# Patient Record
Sex: MALE | Race: WHITE | Employment: FULL TIME | URBAN - METROPOLITAN AREA
[De-identification: names, ages, dates, MRNs, and addresses within clinical notes are randomized per-mention and may not be internally consistent; named-entity substitution may affect disease eponyms.]

---

## 2021-05-14 ENCOUNTER — HOSPITAL ENCOUNTER (EMERGENCY)
Age: 41
Discharge: HOME | End: 2021-05-14
Attending: EMERGENCY MEDICINE
Payer: COMMERCIAL

## 2021-05-14 VITALS
OXYGEN SATURATION: 95 % | WEIGHT: 160 LBS | HEIGHT: 70 IN | BODY MASS INDEX: 22.9 KG/M2 | TEMPERATURE: 97.8 F | SYSTOLIC BLOOD PRESSURE: 120 MMHG | RESPIRATION RATE: 18 BRPM | DIASTOLIC BLOOD PRESSURE: 70 MMHG | HEART RATE: 60 BPM

## 2021-05-14 DIAGNOSIS — F11.93 OPIATE WITHDRAWAL (HCC): Primary | ICD-10-CM

## 2021-05-14 PROCEDURE — 6360000002 HC RX W HCPCS: Performed by: EMERGENCY MEDICINE

## 2021-05-14 PROCEDURE — 99283 EMERGENCY DEPT VISIT LOW MDM: CPT

## 2021-05-14 RX ORDER — BUPRENORPHINE HYDROCHLORIDE 8 MG/1
16 TABLET SUBLINGUAL DAILY
Status: DISCONTINUED | OUTPATIENT
Start: 2021-05-14 | End: 2021-05-14

## 2021-05-14 RX ORDER — LEVOTHYROXINE SODIUM 0.03 MG/1
25 TABLET ORAL DAILY
COMMUNITY
Start: 2021-04-23

## 2021-05-14 RX ORDER — BUPRENORPHINE HYDROCHLORIDE AND NALOXONE HYDROCHLORIDE DIHYDRATE 2; .5 MG/1; MG/1
4 TABLET SUBLINGUAL DAILY
Status: DISCONTINUED | OUTPATIENT
Start: 2021-05-14 | End: 2021-05-14

## 2021-05-14 RX ORDER — BUPRENORPHINE HYDROCHLORIDE 8 MG/1
16 TABLET SUBLINGUAL DAILY
Status: COMPLETED | OUTPATIENT
Start: 2021-05-14 | End: 2021-05-14

## 2021-05-14 RX ADMIN — BUPRENORPHINE HYDROCHLORIDE 16 MG: 8 TABLET SUBLINGUAL at 04:00

## 2021-05-14 ASSESSMENT — ENCOUNTER SYMPTOMS
ABDOMINAL PAIN: 0
SHORTNESS OF BREATH: 0
WHEEZING: 0
DIARRHEA: 0
NAUSEA: 1
VOMITING: 0
COUGH: 0
EYE PAIN: 0
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
## 2021-11-20 ENCOUNTER — HOSPITAL ENCOUNTER (EMERGENCY)
Age: 41
Discharge: HOME | End: 2021-11-20
Payer: COMMERCIAL

## 2021-11-20 ENCOUNTER — APPOINTMENT (OUTPATIENT)
Dept: CT IMAGING | Age: 41
End: 2021-11-20
Payer: COMMERCIAL

## 2021-11-20 VITALS
RESPIRATION RATE: 21 BRPM | SYSTOLIC BLOOD PRESSURE: 139 MMHG | TEMPERATURE: 97.5 F | HEART RATE: 88 BPM | DIASTOLIC BLOOD PRESSURE: 97 MMHG | OXYGEN SATURATION: 97 %

## 2021-11-20 DIAGNOSIS — R10.9 FLANK PAIN: Primary | ICD-10-CM

## 2021-11-20 PROCEDURE — 85025 COMPLETE CBC W/AUTO DIFF WBC: CPT

## 2021-11-20 PROCEDURE — 99285 EMERGENCY DEPT VISIT HI MDM: CPT

## 2021-11-20 PROCEDURE — 74176 CT ABD & PELVIS W/O CONTRAST: CPT

## 2021-11-20 RX ORDER — 0.9 % SODIUM CHLORIDE 0.9 %
1000 INTRAVENOUS SOLUTION INTRAVENOUS ONCE
Status: DISCONTINUED | OUTPATIENT
Start: 2021-11-20 | End: 2021-11-20

## 2021-11-20 RX ORDER — ONDANSETRON 2 MG/ML
4 INJECTION INTRAMUSCULAR; INTRAVENOUS EVERY 30 MIN PRN
Status: DISCONTINUED | OUTPATIENT
Start: 2021-11-20 | End: 2021-11-20

## 2021-11-20 RX ORDER — BUPRENORPHINE AND NALOXONE 8; 2 MG/1; MG/1
1 FILM, SOLUBLE BUCCAL; SUBLINGUAL DAILY
COMMUNITY

## 2021-11-20 RX ORDER — MORPHINE SULFATE 2 MG/ML
4 INJECTION, SOLUTION INTRAMUSCULAR; INTRAVENOUS EVERY 30 MIN PRN
Status: DISCONTINUED | OUTPATIENT
Start: 2021-11-20 | End: 2021-11-20

## 2021-11-20 ASSESSMENT — PAIN DESCRIPTION - DESCRIPTORS
DESCRIPTORS: ACHING;SHARP
DESCRIPTORS: ACHING;SHARP

## 2021-11-20 ASSESSMENT — PAIN SCALES - GENERAL
PAINLEVEL_OUTOF10: 3
PAINLEVEL_OUTOF10: 8

## 2021-11-20 ASSESSMENT — PAIN DESCRIPTION - PAIN TYPE: TYPE: ACUTE PAIN

## 2021-11-20 ASSESSMENT — PAIN DESCRIPTION - FREQUENCY
FREQUENCY: CONTINUOUS
FREQUENCY: CONTINUOUS

## 2021-11-20 ASSESSMENT — PAIN DESCRIPTION - ORIENTATION
ORIENTATION: RIGHT
ORIENTATION: RIGHT

## 2021-11-20 ASSESSMENT — PAIN DESCRIPTION - LOCATION
LOCATION: FLANK
LOCATION: FLANK

## 2021-11-20 NOTE — ED PROVIDER NOTES
Triage Chief Complaint:   Flank Pain    [facility]:  Today in the ED I had the pleasure of caring for Janes Ramon who is a 46 y.o. male that presents today for right-sided flank pain. Acute in onset several hours prior to arrival.  Began in the right sided flank then wrapped around. To the right inguinal region. No associated abdominal pain. No nausea or vomiting. Pain was 9/10. Pain seems to wax and wane. Patient states \"I think I passed a kidney stone\". Patient has no history of kidney stones however. He denies any testicular pain or swelling. However does state that \"my testicles have been vibrating\". When asked patient how long they have been vibrating he tells me since he was born. He denies any new testicular symptoms or pains. He has been voiding baseline. No fever chills. No nausea vomiting diarrhea. No testicular abdominal or back trauma. ROS:  REVIEW OF SYSTEMS    At least 10 systems reviewed      All other review of systems are negative  See HPI and nursing notes for additional information       Past Medical History:   Diagnosis Date    Addiction ([facility] [address]     Thyroid disease      Past Surgical History:   Procedure Laterality Date    TUMOR EXCISION       History reviewed. No pertinent family history.   Social History     Socioeconomic History    Marital status: Single     Spouse name: Not on file    Number of children: Not on file    Years of education: Not on file    Highest education level: Not on file   Occupational History    Not on file   Tobacco Use    Smoking status: Current Every Day Smoker     Packs/day: 1.00     Types: Cigarettes    Smokeless tobacco: Never Used   Substance and Sexual Activity    Alcohol use: Not Currently     Comment: occ    Drug use: Not Currently    Sexual activity: Yes     Partners: Female   Other Topics Concern    Not on file   Social History Narrative    Not on file     Social Determinants of Health     Financial Resource Strain:     Difficulty of Paying Living Expenses: Not on file   Food Insecurity:     Worried About [address] in the Last Year: Not on file    Ran Out of Food in the Last Year: Not on file   Transportation Needs:     Lack of Transportation (Medical): Not on file    Lack of Transportation (Non-Medical): Not on file   Physical Activity:     Days of Exercise per Week: Not on file    Minutes of Exercise per Session: Not on file   Stress:     Feeling of Stress : Not on file   Social Connections:     Frequency of Communication with Friends and Family: Not on file    Frequency of Social Gatherings with Friends and Family: Not on file    Attends Jain Services: Not on file    Active Member of [address] [place] [address] or Organizations: Not on file    Attends Club or Organization Meetings: Not on file    Marital Status: Not on file   Intimate Partner Violence:     Fear of Current or Ex-Partner: Not on file    Emotionally Abused: Not on file    Physically Abused: Not on file    Sexually Abused: Not on file   Housing Stability:     Unable to Pay for Housing in the Last Year: Not on file    Number of [place] in the Last Year: Not on file    Unstable Housing in the Last Year: Not on file     No current facility-administered medications for this encounter. Current Outpatient Medications   Medication Sig Dispense Refill    buprenorphine-naloxone (SUBOXONE) 8-2 MG FILM SL film Place 1 Film under the tongue daily.  levothyroxine (SYNTHROID) 25 MCG tablet Take 25 mcg by mouth daily       Allergies   Allergen Reactions    Nsaids Swelling       Nursing Notes Reviewed    Physical Exam:  ED Triage Vitals [11/20/21 0316]   Enc Vitals Group      BP (!) 139/97      Pulse 74      Resp 18      Temp 97.5 °F (36.4 °C)      Temp Source Oral      SpO2 100 %      Weight       Height       Head Circumference       Peak Flow       Pain Score       Pain Loc       Pain Edu? Excl. in [address]?       General :Patient is awake alert oriented person place and time no acute distress nontoxic appearing  HEENT: Pupils are equally round and reactive to light extraocular motors are intact conjunctivae clear sclerae white there is no injection no icterus. Nose without any rhinorrhea or epistaxis. Oral mucosa is moist no exudate buccal mucosa shows no ulcerations. Uvula is midline    Neck: Neck is supple full range of motion trachea midline thyroid nonpalpable  Cardiac: Heart regular rate rhythm no murmurs rubs clicks or gallops  Lungs: Lungs are clear to auscultation there is no wheezing rhonchi or rales. There is no use of accessory muscles no nasal flaring identified. Chest wall: There is no tenderness to palpation over the chest wall or over ribs  Abdomen: Abdomen is soft nontender nondistended. There is no firm or pulsatile masses no rebound rigidity or guarding negative Arzate's negative McBurney, no peritoneal signs. Minimal right-sided flank tenderness palpation. No muscle spasm. No overlying rash. No bruises or crepitus. Suprapubic:  there is no tenderness to palpation over the external bladder   GENITOURINARY:  Penile lesions are absent. There is no urethral discharge or bleeding. There is no penile erythema, edema, or deformity. There is no scrotal erythema, edema, masses, or tenderness. Inguinal hernias are absent. Perineal crepitus, ecchymoses, erythema, and masses are absent. Musculoskeletal: 5 out of 5 strength in all 4 extremities full flexion extension abduction and adduction supination pronation of all extremities and all digits. No obvious muscle atrophy is noted. No focal muscle deficits are appreciated  Dermatology: Skin is warm and dry there is no obvious abscesses lacerations or lesions noted  Psych: Mentation is grossly normal cognition is grossly normal. Affect is appropriate  Neuro:  Motor intact sensory intact cranial nerves II through XII are intact level of consciousness is normal cerebellar function is normal reflexes are grossly normal. No evidence of incontinence or loss of bowel or bladder no saddle anesthesia noted Lymphatic: There is no submandibular or cervical adenopathy appreciated. I have reviewed and interpreted all of the currently available lab results from this visit (if applicable):  No results found for this visit on 11/20/21. Radiographs (if obtained):  [] The following radiograph was interpreted by myself in the absence of a radiologist:   [] Radiologist's Report Reviewed:  CT ABDOMEN PELVIS WO CONTRAST Additional Contrast? None   Final Result   No acute findings. EKG (if obtained):   Please See Note of attending physician for EKG interpretation. Chart review shows recent radiograph(s):  No results found. MDM:     Interventions given this visit:   Orders Placed This Encounter   Medications    DISCONTD: 0.9 % sodium chloride bolus    DISCONTD: morphine (PF) injection 4 mg    DISCONTD: ondansetron (ZOFRAN) injection 4 mg     . Patient presents today with right-sided flank pain. Likely secondary to recently passed stone. Patient refuses blood work and urinalysis. CT scan without contrast reveals no acute abnormality. Particular no evidence of hydronephrosis. Vital signs are stable here in the ED.  exam is negative. I have discussed the findings of today's workup with the patient and present family members and have addressed their questions and concerns. Important warning signs as well as new or worsening symptoms which would necessitate immediate return to the ED were discussed. The plan is to discharge from the ED at this time, and the patient is in stable condition. The patient acknowledged understanding is agreeable with this plan. The patient and/or family and I have discussed the diagnosis and risks, and we agree with discharging home to follow-up with their primary care, specialist or referral doctor. Questions addressed. Disposition and follow-up agreed upon.  Specific discharge instructions explained. We have discussed the symptoms which are most concerning that necessitate immediate return. We also discussed returning to the Emergency Department immediately if new or worsening symptoms occur. I independently managed patient today in the ED    BP (!) 139/97   Pulse 74   Temp 97.5 °F (36.4 °C) (Oral)   Resp 18   SpO2 100%       Clinical Impression:  1. Flank pain        Disposition referral (if applicable):  [facility] Emergency Department  [address] [facility]  [phone]    If symptoms worsen or persist    Disposition medications (if applicable):  New Prescriptions    No medications on file         Comment: Please note this report has been produced using speech recognition software and may contain errors related to that system including errors in grammar, punctuation, and spelling, as well as words and phrases that may be inappropriate. If there are any questions or concerns please feel free to contact the dictating provider for clarification.       [employer], [address]  11/20/21 0693

## 2021-11-20 NOTE — ED NOTES
Pt refused IV at this time, states \"I want the doctor to look at my testicles first\".       Kelly Schmitz RN  11/20/21 9868

## 2021-11-20 NOTE — ED NOTES
Pt discharged home at this time. Verbalized understanding of all discharge instructions. Leaves with NAD noted.       Kelly Schmitz RN  11/20/21 3292

## 2023-03-02 ENCOUNTER — APPOINTMENT (OUTPATIENT)
Dept: GENERAL RADIOLOGY | Age: 43
End: 2023-03-02
Payer: COMMERCIAL

## 2023-03-02 ENCOUNTER — HOSPITAL ENCOUNTER (EMERGENCY)
Age: 43
Discharge: LEFT BEFORE DISCHARGE | End: 2023-03-02
Attending: EMERGENCY MEDICINE
Payer: COMMERCIAL

## 2023-03-02 VITALS
RESPIRATION RATE: 15 BRPM | SYSTOLIC BLOOD PRESSURE: 164 MMHG | HEIGHT: 70 IN | BODY MASS INDEX: 22.9 KG/M2 | WEIGHT: 160 LBS | HEART RATE: 76 BPM | OXYGEN SATURATION: 98 % | DIASTOLIC BLOOD PRESSURE: 107 MMHG | TEMPERATURE: 98 F

## 2023-03-02 DIAGNOSIS — R00.2 PALPITATIONS: Primary | ICD-10-CM

## 2023-03-02 LAB
ANION GAP SERPL CALCULATED.3IONS-SCNC: 13 MMOL/L (ref 4–16)
BASOPHILS ABSOLUTE: 0.1 K/CU MM
BASOPHILS RELATIVE PERCENT: 0.7 % (ref 0–1)
BUN SERPL-MCNC: 18 MG/DL (ref 6–23)
CALCIUM SERPL-MCNC: 9.4 MG/DL (ref 8.3–10.6)
CHLORIDE BLD-SCNC: 100 MMOL/L (ref 99–110)
CO2: 25 MMOL/L (ref 21–32)
CREAT SERPL-MCNC: 0.9 MG/DL (ref 0.9–1.3)
DIFFERENTIAL TYPE: ABNORMAL
EKG ATRIAL RATE: 69 BPM
EKG DIAGNOSIS: NORMAL
EKG P AXIS: 56 DEGREES
EKG P-R INTERVAL: 132 MS
EKG Q-T INTERVAL: 458 MS
EKG QRS DURATION: 110 MS
EKG QTC CALCULATION (BAZETT): 490 MS
EKG R AXIS: -2 DEGREES
EKG T AXIS: 55 DEGREES
EKG VENTRICULAR RATE: 69 BPM
EOSINOPHILS ABSOLUTE: 0.1 K/CU MM
EOSINOPHILS RELATIVE PERCENT: 1.7 % (ref 0–3)
GFR SERPL CREATININE-BSD FRML MDRD: >60 ML/MIN/1.73M2
GLUCOSE SERPL-MCNC: 90 MG/DL (ref 70–99)
HCT VFR BLD CALC: 45.1 % (ref 42–52)
HEMOGLOBIN: 15.1 GM/DL (ref 13.5–18)
IMMATURE NEUTROPHIL %: 0.1 % (ref 0–0.43)
LYMPHOCYTES ABSOLUTE: 3.7 K/CU MM
LYMPHOCYTES RELATIVE PERCENT: 46 % (ref 24–44)
MCH RBC QN AUTO: 31.8 PG (ref 27–31)
MCHC RBC AUTO-ENTMCNC: 33.5 % (ref 32–36)
MCV RBC AUTO: 94.9 FL (ref 78–100)
MONOCYTES ABSOLUTE: 0.6 K/CU MM
MONOCYTES RELATIVE PERCENT: 7.8 % (ref 0–4)
NUCLEATED RBC %: 0 %
PDW BLD-RTO: 11.1 % (ref 11.7–14.9)
PLATELET # BLD: 301 K/CU MM (ref 140–440)
PMV BLD AUTO: 9.1 FL (ref 7.5–11.1)
POTASSIUM SERPL-SCNC: 4.1 MMOL/L (ref 3.5–5.1)
RBC # BLD: 4.75 M/CU MM (ref 4.6–6.2)
SEGMENTED NEUTROPHILS ABSOLUTE COUNT: 3.5 K/CU MM
SEGMENTED NEUTROPHILS RELATIVE PERCENT: 43.7 % (ref 36–66)
SODIUM BLD-SCNC: 138 MMOL/L (ref 135–145)
TOTAL IMMATURE NEUTOROPHIL: 0.01 K/CU MM
TOTAL NUCLEATED RBC: 0 K/CU MM
TROPONIN T: <0.01 NG/ML
WBC # BLD: 8.1 K/CU MM (ref 4–10.5)

## 2023-03-02 PROCEDURE — 99285 EMERGENCY DEPT VISIT HI MDM: CPT

## 2023-03-02 PROCEDURE — 93005 ELECTROCARDIOGRAM TRACING: CPT | Performed by: EMERGENCY MEDICINE

## 2023-03-02 PROCEDURE — 84484 ASSAY OF TROPONIN QUANT: CPT

## 2023-03-02 PROCEDURE — 71046 X-RAY EXAM CHEST 2 VIEWS: CPT

## 2023-03-02 PROCEDURE — 93010 ELECTROCARDIOGRAM REPORT: CPT | Performed by: INTERNAL MEDICINE

## 2023-03-02 PROCEDURE — 80048 BASIC METABOLIC PNL TOTAL CA: CPT

## 2023-03-02 PROCEDURE — 85025 COMPLETE CBC W/AUTO DIFF WBC: CPT

## 2023-03-02 ASSESSMENT — LIFESTYLE VARIABLES: HOW OFTEN DO YOU HAVE A DRINK CONTAINING ALCOHOL: NEVER

## 2023-03-02 NOTE — ED PROVIDER NOTES
[address] ENCOUNTER      Pt Name: Denver Seay  MRN: [id number]  [place] 1980  Date of evaluation: 3/2/2023  Provider: Esvin Jean MD  Insurance:  Payor: Junaid Sneed / Plan: Kaylee Barrow / Product Type: *No Product type* /   Current Code Status   Code Status: Not on file     CHIEF COMPLAINT       Chief Complaint   Patient presents with    Hypertension     As well as fluttering in chest         HISTORY OF PRESENT ILLNESS    HPI    Nursing Notes were reviewed. This is a 38 y.o. male who presents to the emergency department with hypertension and episodic palpitations. The patient says that he was at his outpatient providers office when he was told to come to the emergency department due to elevated blood pressure. The patient says that he saw blood pressures greater than 200 at the time he was in the office. Patient also describes paroxysmal/episodic palpitations. He denies chest pain. Denies nausea or vomiting. Denies recent fevers. Denies shortness of breath. Denies cough. The patient says he was evaluated for the palpitations by his cardiologist, was given a monitor to wear, and has a follow-up appointment next month. PAST MEDICAL HISTORY     Past Medical History:   Diagnosis Date    Addiction [facility])     Thyroid disease          SURGICAL HISTORY       Past Surgical History:   Procedure Laterality Date    TUMOR EXCISION           CURRENT MEDICATIONS       Discharge Medication List as of 3/2/2023  4:37 PM        CONTINUE these medications which have NOT CHANGED    Details   buprenorphine-naloxone (SUBOXONE) 8-2 MG FILM SL film Place 1 Film under the tongue daily. Historical Med      levothyroxine (SYNTHROID) 25 MCG tablet Take 25 mcg by mouth dailyHistorical Med             ALLERGIES     Nsaids    FAMILY HISTORY     History reviewed. No pertinent family history.        SOCIAL HISTORY       Social History     Socioeconomic History    Marital status: Single     Spouse name: None    Number of children: None    Years of education: None    Highest education level: None   Tobacco Use    Smoking status: Every Day     Packs/day: 1.00     Types: Cigarettes    Smokeless tobacco: Never   Substance and Sexual Activity    Alcohol use: Not Currently     Comment: occ    Drug use: Not Currently    Sexual activity: Yes     Partners: Female       PHYSICAL EXAM       ED Triage Vitals [03/02/23 1337]   BP Temp Temp src Heart Rate Resp SpO2 Height Weight   (!) 151/109 98 °F (36.7 °C) -- 76 15 98 % 5' 10\" (1.778 m) 160 lb (72.6 kg)       Physical Exam  Vitals and nursing note reviewed. Constitutional:       General: He is not in acute distress. Appearance: Normal appearance. He is not ill-appearing, toxic-appearing or diaphoretic. HENT:      Head: Normocephalic and atraumatic. Nose: Nose normal.      Mouth/Throat:      Mouth: Mucous membranes are moist.   Eyes:      Extraocular Movements: Extraocular movements intact. Cardiovascular:      Rate and Rhythm: Normal rate and regular rhythm. Heart sounds: Normal heart sounds. Pulmonary:      Effort: Pulmonary effort is normal.      Breath sounds: Normal breath sounds. Abdominal:      Palpations: Abdomen is soft. Musculoskeletal:         General: Normal range of motion. Skin:     General: Skin is warm. Neurological:      Mental Status: He is alert. Vitals:    Vitals:    03/02/23 1337 03/02/23 1402 03/02/23 1432 03/02/23 1501   BP: (!) 151/109 (!) 140/93 (!) 175/101 (!) 164/107   Pulse: 76      Resp: 15      Temp: 98 °F (36.7 °C)      SpO2: 98% 99% 99% 98%   Weight: 160 lb (72.6 kg)      Height: 5' 10\" (1.778 m)          DIAGNOSTIC RESULTS     EKG: All EKG's are interpreted by the Emergency Department Physician who either signs or Co-signs this chart in the absence of a cardiologist.    Normal sinus rhythm. Ventricular of 69. WA is 132. QRS is 110. QTc is somewhat prolonged at 490. There is no previous EKG available for comparison at this time. RADIOLOGY:   Non-plain film images such as CT, Ultrasound and MRI are read by the radiologist. Plain radiographic images are visualized and preliminarily interpreted by the emergency physician with the below findings:    Interpretation per the Radiologist below, if available at the time of this note:    XR CHEST (2 VW)   Final Result   No active cardiopulmonary process. LABS:  Labs Reviewed   CBC WITH AUTO DIFFERENTIAL - Abnormal; Notable for the following components:       Result Value    MCH 31.8 (*)     RDW 11.1 (*)     Lymphocytes % 46.0 (*)     Monocytes % 7.8 (*)     All other components within normal limits   TROPONIN   BASIC METABOLIC PANEL       All other labs were within normal range or not returned as of this dictation. MEDICAL DECISION MAKING   Medications - No data to display          Riana Coma Scale  Eye Opening: Spontaneous  Best Verbal Response: Oriented  Best Motor Response: Obeys commands  Riana Coma Scale Score: 15                     CIWA Assessment  BP: (!) 164/107  Heart Rate: 68                 MDM  This is a 38 y.o. male who presents to the emergency department with hypertension and episodic palpitations. The patient says that he was at his outpatient providers office when he was told to come to the emergency department due to elevated blood pressure. The patient says that he saw blood pressures greater than 200 at the time he was in the office. Patient also describes paroxysmal/episodic palpitations. He denies chest pain. Denies nausea or vomiting. Denies recent fevers. Denies shortness of breath. Denies cough. The patient says he was evaluated for the palpitations by his cardiologist, was given a monitor to wear, and has a follow-up appointment next month. Review of medical records:  Review of the patient's cardiology records indicates the following:  The patient was evaluated by cardiology in December 2022. The patient was given a Holter monitor which demonstrated a predominant rhythm of sinus with episodic periods of tachycardia, approximately 8.8% of the time. There were frequent premature ventricular ectopic beats. There were a number of premature supraventricular ectopic beats with one episode of SVT that spontaneously resolved. Cardiology notes further indicate that they attempted to contact the patient on January 24 and were unable to do so. At that time, they had intended to start the patient on Toprol-XL 12.5 mg daily. They still plan to have the patient complete an echocardiogram.    Sodium and potassium are normal indicating a low likelihood of hypo or hypernatremia, as well as hypo or hyperkalemia. BUN and creatinine are normal indicating a low likelihood of acute kidney injury or renal failure. Hemoglobin hematocrit are normal indicating no evidence of significant anemia. ECG shows no ST elevations and cardiac enzymes are normal indicating a low likelihood of STEMI or NSTEMI    Chest x-ray was completed and shows no evidence of significant intrathoracic pathology including no evidence of pneumonia, pneumothorax, and a low likelihood of aortic disease. Definitive etiology of the patient's symptoms was not clearly identified here in the emergency department, however, review of his records indicates that his palpitations are likely secondary to his tacky dysrhythmias identified on his pacemaker interrogation when he saw his cardiologist in December. They were unable to contact him for follow-up. I had planned on discussing this with the patient and I had planned on starting him on the medication that they recommended, however, when I returned to the patient's room to discuss his medical treatment plan, the patient had eloped from the emergency department.   He left the emergency department without discussing his plans with myself or with staff. Clinical Diagnoses Addressed  1. Palpitations            CONSULTS:  None    PROCEDURES:  Unless otherwise noted below, none     Procedures      FINAL IMPRESSION      1. Palpitations          DISPOSITION/PLAN   DISPOSITION Eloped - Left Before Treatment Complete 03/02/2023 04:36:43 PM      PATIENT REFERRED TO:  No follow-up provider specified. DISCHARGE MEDICATIONS:  Discharge Medication List as of 3/2/2023  4:37 PM        Controlled Substances Monitoring:     No flowsheet data found.     Alhaji Joe MD (electronically signed)  Attending Emergency Physician            Alhaji Joe MD  03/02/23 4734

## 2024-01-05 ENCOUNTER — HOSPITAL ENCOUNTER (EMERGENCY)
Age: 44
Discharge: PSYCHIATRIC OR SUBSTANCE USE FACILITY | End: 2024-01-06
Attending: EMERGENCY MEDICINE
Payer: COMMERCIAL

## 2024-01-05 DIAGNOSIS — T40.2X2A OPIOID OVERDOSE, INTENTIONAL SELF-HARM, INITIAL ENCOUNTER (HCC): ICD-10-CM

## 2024-01-05 DIAGNOSIS — F39 MOOD DISORDER (HCC): Primary | ICD-10-CM

## 2024-01-05 LAB
BASOPHILS ABSOLUTE: 0.1 K/CU MM
BASOPHILS RELATIVE PERCENT: 0.5 % (ref 0–1)
DIFFERENTIAL TYPE: ABNORMAL
EOSINOPHILS ABSOLUTE: 0.1 K/CU MM
EOSINOPHILS RELATIVE PERCENT: 1.3 % (ref 0–3)
HCT VFR BLD CALC: 44.3 % (ref 42–52)
HEMOGLOBIN: 14.9 GM/DL (ref 13.5–18)
IMMATURE NEUTROPHIL %: 0.2 % (ref 0–0.43)
LYMPHOCYTES ABSOLUTE: 3.9 K/CU MM
LYMPHOCYTES RELATIVE PERCENT: 35.6 % (ref 24–44)
MCH RBC QN AUTO: 31.2 PG (ref 27–31)
MCHC RBC AUTO-ENTMCNC: 33.6 % (ref 32–36)
MCV RBC AUTO: 92.7 FL (ref 78–100)
MONOCYTES ABSOLUTE: 0.9 K/CU MM
MONOCYTES RELATIVE PERCENT: 8.6 % (ref 0–4)
NUCLEATED RBC %: 0 %
PDW BLD-RTO: 11.5 % (ref 11.7–14.9)
PLATELET # BLD: 315 K/CU MM (ref 140–440)
PMV BLD AUTO: 9.3 FL (ref 7.5–11.1)
RBC # BLD: 4.78 M/CU MM (ref 4.6–6.2)
SEGMENTED NEUTROPHILS ABSOLUTE COUNT: 5.9 K/CU MM
SEGMENTED NEUTROPHILS RELATIVE PERCENT: 53.8 % (ref 36–66)
TOTAL IMMATURE NEUTOROPHIL: 0.02 K/CU MM
TOTAL NUCLEATED RBC: 0 K/CU MM
TOTAL RETICULOCYTE COUNT: 0.05 K/CU MM
WBC # BLD: 10.9 K/CU MM (ref 4–10.5)

## 2024-01-05 PROCEDURE — 99285 EMERGENCY DEPT VISIT HI MDM: CPT

## 2024-01-05 PROCEDURE — G0480 DRUG TEST DEF 1-7 CLASSES: HCPCS

## 2024-01-05 PROCEDURE — 85025 COMPLETE CBC W/AUTO DIFF WBC: CPT

## 2024-01-05 PROCEDURE — 80053 COMPREHEN METABOLIC PANEL: CPT

## 2024-01-06 VITALS
WEIGHT: 175 LBS | TEMPERATURE: 97.6 F | OXYGEN SATURATION: 99 % | HEIGHT: 70 IN | HEART RATE: 80 BPM | SYSTOLIC BLOOD PRESSURE: 134 MMHG | DIASTOLIC BLOOD PRESSURE: 76 MMHG | RESPIRATION RATE: 18 BRPM | BODY MASS INDEX: 25.05 KG/M2

## 2024-01-06 LAB
ACETAMINOPHEN LEVEL: <5 UG/ML (ref 15–30)
ALBUMIN SERPL-MCNC: 4.2 GM/DL (ref 3.4–5)
ALCOHOL SCREEN SERUM: 0.06 %WT/VOL
ALP BLD-CCNC: 88 IU/L (ref 40–129)
ALT SERPL-CCNC: 58 U/L (ref 10–40)
AMPHETAMINES: ABNORMAL
ANION GAP SERPL CALCULATED.3IONS-SCNC: 17 MMOL/L (ref 7–16)
AST SERPL-CCNC: 45 IU/L (ref 15–37)
BARBITURATE SCREEN URINE: NEGATIVE
BENZODIAZEPINE SCREEN, URINE: NEGATIVE
BILIRUB SERPL-MCNC: 0.3 MG/DL (ref 0–1)
BILIRUBIN URINE: NEGATIVE MG/DL
BLOOD, URINE: NEGATIVE
BUN SERPL-MCNC: 24 MG/DL (ref 6–23)
CALCIUM SERPL-MCNC: 9 MG/DL (ref 8.3–10.6)
CANNABINOID SCREEN URINE: ABNORMAL
CHLORIDE BLD-SCNC: 99 MMOL/L (ref 99–110)
CLARITY: CLEAR
CO2: 21 MMOL/L (ref 21–32)
COCAINE METABOLITE: NEGATIVE
COLOR: YELLOW
COMMENT UA: NORMAL
CREAT SERPL-MCNC: 0.9 MG/DL (ref 0.9–1.3)
DOSE AMOUNT: ABNORMAL
DOSE AMOUNT: ABNORMAL
DOSE TIME: ABNORMAL
DOSE TIME: ABNORMAL
FENTANYL URINE: NEGATIVE
GFR SERPL CREATININE-BSD FRML MDRD: >60 ML/MIN/1.73M2
GLUCOSE SERPL-MCNC: 94 MG/DL (ref 70–99)
GLUCOSE, URINE: NEGATIVE MG/DL
KETONES, URINE: NEGATIVE MG/DL
LEUKOCYTE ESTERASE, URINE: NEGATIVE
NITRITE URINE, QUANTITATIVE: NEGATIVE
OPIATES, URINE: NEGATIVE
OXYCODONE: NEGATIVE
PH, URINE: 5.5 (ref 5–8)
POTASSIUM SERPL-SCNC: 3.3 MMOL/L (ref 3.5–5.1)
PROTEIN UA: NEGATIVE MG/DL
SALICYLATE LEVEL: <0.3 MG/DL (ref 15–30)
SODIUM BLD-SCNC: 137 MMOL/L (ref 135–145)
SPECIFIC GRAVITY UA: 1.02 (ref 1–1.03)
TOTAL PROTEIN: 7.3 GM/DL (ref 6.4–8.2)
UROBILINOGEN, URINE: 0.2 MG/DL (ref 0.2–1)

## 2024-01-06 PROCEDURE — 80307 DRUG TEST PRSMV CHEM ANLYZR: CPT

## 2024-01-06 PROCEDURE — 81003 URINALYSIS AUTO W/O SCOPE: CPT

## 2024-01-06 PROCEDURE — 90792 PSYCH DIAG EVAL W/MED SRVCS: CPT

## 2024-01-06 ASSESSMENT — PAIN DESCRIPTION - LOCATION: LOCATION: HEAD

## 2024-01-06 ASSESSMENT — PAIN SCALES - GENERAL: PAINLEVEL_OUTOF10: 3

## 2024-01-06 NOTE — ACP (ADVANCE CARE PLANNING)
Patient does not have any ACP documents/Medical Power of .     LSW notes hospital will follow [place]'s Next of Kin hierarchy in the following descending order for priority:    Guardian  Spouse  Majority of adult Children  Parents  Majority of adult Siblings  Nearest Relative not described above    Per [place] Next of Kin hierarchy: Patients' parent will be Primary Healthcare Decision Maker.

## 2024-01-06 NOTE — ED NOTES
Superior at the bedside for pt transport, all paperwork provided to superior with pt belongings, pt transferred stable to AM behavior.

## 2024-01-06 NOTE — ED NOTES
Pt resting in bed eyes closed, sitter at the bedside, no noted s/s of distress, awaiting for placement.
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
## 2024-01-06 NOTE — ED NOTES
Transfer Center Checklist for Behavioral Health Transfers      Currently in Restraints Now or During this Encounter: No  (Specify if Agitation or self harm is noted in ED?)            Medical Clearance Documented and Verified in the Chart: Yes    LABS  Labs Resulted (see below, if applicable): Yes  Are Any of the Labs Abnormal: No    CBC:   Lab Results   Component Value Date/Time    WBC 10.9 01/05/2024 11:30 PM    RBC 4.78 01/05/2024 11:30 PM    HGB 14.9 01/05/2024 11:30 PM    HCT 44.3 01/05/2024 11:30 PM    MCV 92.7 01/05/2024 11:30 PM    MCH 31.2 01/05/2024 11:30 PM    MCHC 33.6 01/05/2024 11:30 PM    RDW 11.5 01/05/2024 11:30 PM     01/05/2024 11:30 PM    MPV 9.3 01/05/2024 11:30 PM     CMP:   Lab Results   Component Value Date/Time     01/05/2024 11:30 PM    K 3.3 01/05/2024 11:30 PM    CL 99 01/05/2024 11:30 PM    CO2 21 01/05/2024 11:30 PM    BUN 24 01/05/2024 11:30 PM    CREATININE 0.9 01/05/2024 11:30 PM    LABGLOM >60 01/05/2024 11:30 PM    GLUCOSE 94 01/05/2024 11:30 PM    PROT 7.3 01/05/2024 11:30 PM    LABALBU 4.2 01/05/2024 11:30 PM    CALCIUM 9.0 01/05/2024 11:30 PM    BILITOT 0.3 01/05/2024 11:30 PM    ALKPHOS 88 01/05/2024 11:30 PM    AST 45 01/05/2024 11:30 PM    ALT 58 01/05/2024 11:30 PM     Drug Panel:   Lab Results   Component Value Date/Time    OPIAU NEGATIVE 01/06/2024 12:22 AM     UA:  Lab Results   Component Value Date/Time    COLORU YELLOW 01/06/2024 12:22 AM    LABPH 5.5 01/06/2024 12:22 AM    PROTEINU NEGATIVE 01/06/2024 12:22 AM    KETUA NEGATIVE 01/06/2024 12:22 AM    BILIRUBINUR NEGATIVE 01/06/2024 12:22 AM    BLOODU NEGATIVE 01/06/2024 12:22 AM    UROBILINOGEN 0.2 01/06/2024 12:22 AM    NITRU NEGATIVE 01/06/2024 12:22 AM    LEUKOCYTESUR NEGATIVE 01/06/2024 12:22 AM     PREGNANCY TEST: No results found for: \"PREGTESTUR\"    Patient's Current Location: [facility] EMERGENCY DEPARTMENT     Chief Complaint   Patient presents with
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
## 2024-01-06 NOTE — ED NOTES
Pt mother called, provided report on pt status per pt approval, pt currently communicating to mother over the phone, sitter at the bedside, will continue to monitor.

## 2024-01-06 NOTE — ED NOTES
Pt pink slip faxed to [phone] fax #[phone]. Spoke with Isabel @  [facility] and they are aware that they should be receiving the pink slip ASAP.

## 2024-01-06 NOTE — ED PROVIDER NOTES
[facility] EMERGENCY DEPARTMENT  EMERGENCY DEPARTMENT ENCOUNTER      Pt Name: Napoleon Plasencia  MRN: [id number]  Birthdate 1980  Date of evaluation: 1/5/2024  Provider: Ysabel Bustamante MD    CHIEF COMPLAINT       Chief Complaint   Patient presents with    Suicidal    Drug Overdose     4mg nasal         HISTORY OF PRESENT ILLNESS      Napoleon Plasencia is a 43 y.o. male who presents to the emergency department  for   Chief Complaint   Patient presents with    Suicidal    Drug Overdose     4mg nasal       43-year-old male presents on a pink slip for reported intentional act of self-harm by intentional overdose of Suboxone.  He comes in by police.  Patient reports that has been under stress due to some family situations recently.  He is currently on Suboxone as a maintenance medicine.  We do not have a collateral historian at bedside but reportedly he made statements to family about wanting to harm himself.  He was later found \"unresponsive\" after having taking some of his Suboxone.  Authorities were called to scene.  He was given 4 mg of Narcan with improvement in his mental status.  He is brought to the emergency department GCS of 15.  Alert and oriented.  He does seem a bit emotionally labile in the emergency department.  He does endorse some thoughts of self-harm.  He denies any other ingestions.          Nursing Notes, Triage Notes & Vital Signs were reviewed.      REVIEW OF SYSTEMS    (2-9 systems for level 4, 10 or more for level 5)     Review of Systems   Psychiatric/Behavioral:  Positive for suicidal ideas.        Except as noted above the remainder of the review of systems was reviewed and negative.       PAST MEDICAL HISTORY     Past Medical History:   Diagnosis Date    Addiction (HCC)     Thyroid disease        Prior to Admission medications    Medication Sig Start Date End Date Taking? Authorizing Provider   buprenorphine-naloxone (SUBOXONE) 8-2 MG FILM SL film  MEDICATIONS:  New Prescriptions    No medications on file       ED Provider Disposition Time  DISPOSITION Decision To Transfer 01/06/2024 04:47:24 AM      Appropriate personal protective equipment was worn during the patient's evaluation.  These included surgical, eye protection, surgical mask or in 95 respirator and gloves.  The patient was also placed in a surgical mask for the prevention of possible spread of respiratory viral illnesses.    The Patient was instructed to read the package inserts with any medication that was prescribed.  Major potential reactions and medication interactions were discussed.  The Patient understands that there are numerous possible adverse reactions not covered.    The patient was also instructed to arrange follow-up with his or her primary care provider for review of any pending labwork or incidental findings on any radiology results that were obtained.  All efforts were made to discuss any incidental findings that require further monitoring.      Controlled Substances Monitoring:          No data to display                (Please note that portions of this note were completed with a voice recognition program.  Efforts were made to edit the dictations but occasionally words are mis-transcribed.)    Ysabel Bustamante MD (electronically signed)  Attending Emergency Physician           Ysabel Myers MD  01/06/24 0351       Ysabel Myers MD  01/06/24 0448

## 2024-04-01 NOTE — ED TRIAGE NOTES
Dr tee sent patient over d/t hypertension.  Patient states he has a fluttering in his chest
Hypercholesterolemia

## 2025-06-01 ENCOUNTER — HOSPITAL ENCOUNTER (INPATIENT)
Age: 45
LOS: 3 days | Discharge: PSYCHIATRIC OR SUBSTANCE USE FACILITY | End: 2025-06-05
Admitting: STUDENT IN AN ORGANIZED HEALTH CARE EDUCATION/TRAINING PROGRAM
Payer: COMMERCIAL

## 2025-06-01 DIAGNOSIS — Z51.81 ENCOUNTER FOR MONITORING SUBOXONE MAINTENANCE THERAPY: ICD-10-CM

## 2025-06-01 DIAGNOSIS — F10.930 ALCOHOL WITHDRAWAL SYNDROME WITHOUT COMPLICATION (HCC): ICD-10-CM

## 2025-06-01 DIAGNOSIS — R45.851 SUICIDAL IDEATION: Primary | ICD-10-CM

## 2025-06-01 DIAGNOSIS — Z79.891 ENCOUNTER FOR MONITORING SUBOXONE MAINTENANCE THERAPY: ICD-10-CM

## 2025-06-01 DIAGNOSIS — F10.129: ICD-10-CM

## 2025-06-01 LAB
ALBUMIN SERPL-MCNC: 4.6 G/DL (ref 3.4–5)
ALBUMIN/GLOB SERPL: 1.4 {RATIO} (ref 1.1–2.2)
ALP SERPL-CCNC: 220 U/L (ref 40–129)
ALT SERPL-CCNC: 186 U/L (ref 10–40)
AMPHET UR QL SCN: NEGATIVE
ANION GAP SERPL CALCULATED.3IONS-SCNC: 20 MMOL/L (ref 9–17)
APAP SERPL-MCNC: <5 UG/ML (ref 10–30)
AST SERPL-CCNC: 351 U/L (ref 15–37)
BARBITURATES UR QL SCN: NEGATIVE
BENZODIAZ UR QL: NEGATIVE
BILIRUB SERPL-MCNC: 1 MG/DL (ref 0–1)
BUN SERPL-MCNC: 9 MG/DL (ref 7–20)
CALCIUM SERPL-MCNC: 10 MG/DL (ref 8.3–10.6)
CANNABINOIDS UR QL SCN: POSITIVE
CHLORIDE SERPL-SCNC: 102 MMOL/L (ref 99–110)
CO2 SERPL-SCNC: 21 MMOL/L (ref 21–32)
COCAINE UR QL SCN: NEGATIVE
CREAT SERPL-MCNC: 0.7 MG/DL (ref 0.9–1.3)
ERYTHROCYTE [DISTWIDTH] IN BLOOD BY AUTOMATED COUNT: 11.8 % (ref 11.7–14.9)
ETHANOLAMINE SERPL-MCNC: 409 MG/DL (ref 0–0.08)
FENTANYL UR QL: NEGATIVE
GFR, ESTIMATED: >90 ML/MIN/1.73M2
GLUCOSE SERPL-MCNC: 111 MG/DL (ref 74–99)
HCT VFR BLD AUTO: 50.4 % (ref 42–52)
HGB BLD-MCNC: 17.2 G/DL (ref 13.5–18)
MCH RBC QN AUTO: 36.1 PG (ref 27–31)
MCHC RBC AUTO-ENTMCNC: 34.1 G/DL (ref 32–36)
MCV RBC AUTO: 105.7 FL (ref 78–100)
OPIATES UR QL SCN: NEGATIVE
OXYCODONE UR QL SCN: NEGATIVE
PLATELET, FLUORESCENCE: 203 K/UL (ref 140–440)
PMV BLD AUTO: 9.7 FL (ref 7.5–11.1)
POTASSIUM SERPL-SCNC: 4 MMOL/L (ref 3.5–5.1)
PROT SERPL-MCNC: 7.8 G/DL (ref 6.4–8.2)
RBC # BLD AUTO: 4.77 M/UL (ref 4.6–6.2)
SALICYLATES SERPL-MCNC: <0.5 MG/DL (ref 15–30)
SODIUM SERPL-SCNC: 143 MMOL/L (ref 136–145)
TEST INFORMATION: ABNORMAL
WBC OTHER # BLD: 6.7 K/UL (ref 4–10.5)

## 2025-06-01 PROCEDURE — 80053 COMPREHEN METABOLIC PANEL: CPT

## 2025-06-01 PROCEDURE — 85027 COMPLETE CBC AUTOMATED: CPT

## 2025-06-01 PROCEDURE — 80307 DRUG TEST PRSMV CHEM ANLYZR: CPT

## 2025-06-01 PROCEDURE — 80179 DRUG ASSAY SALICYLATE: CPT

## 2025-06-01 PROCEDURE — 99285 EMERGENCY DEPT VISIT HI MDM: CPT

## 2025-06-01 PROCEDURE — 90792 PSYCH DIAG EVAL W/MED SRVCS: CPT | Performed by: REGISTERED NURSE

## 2025-06-01 PROCEDURE — 80143 DRUG ASSAY ACETAMINOPHEN: CPT

## 2025-06-01 PROCEDURE — G0480 DRUG TEST DEF 1-7 CLASSES: HCPCS

## 2025-06-01 ASSESSMENT — PAIN - FUNCTIONAL ASSESSMENT: PAIN_FUNCTIONAL_ASSESSMENT: NONE - DENIES PAIN

## 2025-06-02 PROBLEM — F10.920: Status: ACTIVE | Noted: 2025-06-02

## 2025-06-02 LAB
ETHANOLAMINE SERPL-MCNC: 120 MG/DL (ref 0–0.08)
ETHANOLAMINE SERPL-MCNC: <10 MG/DL (ref 0–0.08)

## 2025-06-02 PROCEDURE — 99214 OFFICE O/P EST MOD 30 MIN: CPT

## 2025-06-02 PROCEDURE — 6370000000 HC RX 637 (ALT 250 FOR IP): Performed by: STUDENT IN AN ORGANIZED HEALTH CARE EDUCATION/TRAINING PROGRAM

## 2025-06-02 PROCEDURE — 2580000003 HC RX 258: Performed by: STUDENT IN AN ORGANIZED HEALTH CARE EDUCATION/TRAINING PROGRAM

## 2025-06-02 PROCEDURE — 2500000003 HC RX 250 WO HCPCS: Performed by: STUDENT IN AN ORGANIZED HEALTH CARE EDUCATION/TRAINING PROGRAM

## 2025-06-02 PROCEDURE — 6360000002 HC RX W HCPCS: Performed by: STUDENT IN AN ORGANIZED HEALTH CARE EDUCATION/TRAINING PROGRAM

## 2025-06-02 PROCEDURE — G0480 DRUG TEST DEF 1-7 CLASSES: HCPCS

## 2025-06-02 PROCEDURE — 94761 N-INVAS EAR/PLS OXIMETRY MLT: CPT

## 2025-06-02 PROCEDURE — 1200000000 HC SEMI PRIVATE

## 2025-06-02 RX ORDER — ENOXAPARIN SODIUM 100 MG/ML
40 INJECTION SUBCUTANEOUS DAILY
Status: DISCONTINUED | OUTPATIENT
Start: 2025-06-02 | End: 2025-06-05 | Stop reason: HOSPADM

## 2025-06-02 RX ORDER — SODIUM CHLORIDE 0.9 % (FLUSH) 0.9 %
5-40 SYRINGE (ML) INJECTION EVERY 12 HOURS SCHEDULED
Status: DISCONTINUED | OUTPATIENT
Start: 2025-06-02 | End: 2025-06-05 | Stop reason: HOSPADM

## 2025-06-02 RX ORDER — LANOLIN ALCOHOL/MO/W.PET/CERES
100 CREAM (GRAM) TOPICAL DAILY
Status: DISCONTINUED | OUTPATIENT
Start: 2025-06-02 | End: 2025-06-05 | Stop reason: HOSPADM

## 2025-06-02 RX ORDER — ONDANSETRON 2 MG/ML
4 INJECTION INTRAMUSCULAR; INTRAVENOUS EVERY 6 HOURS PRN
Status: DISCONTINUED | OUTPATIENT
Start: 2025-06-02 | End: 2025-06-05 | Stop reason: HOSPADM

## 2025-06-02 RX ORDER — LANOLIN ALCOHOL/MO/W.PET/CERES
100 CREAM (GRAM) TOPICAL DAILY
Status: DISCONTINUED | OUTPATIENT
Start: 2025-06-02 | End: 2025-06-03

## 2025-06-02 RX ORDER — ONDANSETRON 4 MG/1
4 TABLET, ORALLY DISINTEGRATING ORAL ONCE
Status: COMPLETED | OUTPATIENT
Start: 2025-06-02 | End: 2025-06-02

## 2025-06-02 RX ORDER — SCOPOLAMINE 1 MG/3D
1 PATCH, EXTENDED RELEASE TRANSDERMAL ONCE
Status: COMPLETED | OUTPATIENT
Start: 2025-06-02 | End: 2025-06-05

## 2025-06-02 RX ORDER — POTASSIUM CHLORIDE 1500 MG/1
40 TABLET, EXTENDED RELEASE ORAL PRN
Status: DISCONTINUED | OUTPATIENT
Start: 2025-06-02 | End: 2025-06-05

## 2025-06-02 RX ORDER — POTASSIUM CHLORIDE 7.45 MG/ML
10 INJECTION INTRAVENOUS PRN
Status: DISCONTINUED | OUTPATIENT
Start: 2025-06-02 | End: 2025-06-05

## 2025-06-02 RX ORDER — SODIUM CHLORIDE 9 MG/ML
INJECTION, SOLUTION INTRAVENOUS PRN
Status: DISCONTINUED | OUTPATIENT
Start: 2025-06-02 | End: 2025-06-05 | Stop reason: HOSPADM

## 2025-06-02 RX ORDER — BUPRENORPHINE HYDROCHLORIDE AND NALOXONE HYDROCHLORIDE DIHYDRATE 8; 2 MG/1; MG/1
1 TABLET SUBLINGUAL DAILY
Status: DISCONTINUED | OUTPATIENT
Start: 2025-06-02 | End: 2025-06-05

## 2025-06-02 RX ORDER — PHENOBARBITAL 32.4 MG/1
32.4 TABLET ORAL 2 TIMES DAILY
Status: COMPLETED | OUTPATIENT
Start: 2025-06-03 | End: 2025-06-04

## 2025-06-02 RX ORDER — MAGNESIUM SULFATE IN WATER 40 MG/ML
2000 INJECTION, SOLUTION INTRAVENOUS PRN
Status: DISCONTINUED | OUTPATIENT
Start: 2025-06-02 | End: 2025-06-05

## 2025-06-02 RX ORDER — PHENOBARBITAL 32.4 MG/1
32.4 TABLET ORAL DAILY
Status: DISCONTINUED | OUTPATIENT
Start: 2025-06-05 | End: 2025-06-04

## 2025-06-02 RX ORDER — ONDANSETRON 4 MG/1
4 TABLET, ORALLY DISINTEGRATING ORAL EVERY 8 HOURS PRN
Status: DISCONTINUED | OUTPATIENT
Start: 2025-06-02 | End: 2025-06-05 | Stop reason: HOSPADM

## 2025-06-02 RX ORDER — PHENOBARBITAL 32.4 MG/1
32.4 TABLET ORAL EVERY 6 HOURS PRN
Status: DISPENSED | OUTPATIENT
Start: 2025-06-02 | End: 2025-06-04

## 2025-06-02 RX ORDER — PHENOBARBITAL 32.4 MG/1
16.2 TABLET ORAL 2 TIMES DAILY
Status: COMPLETED | OUTPATIENT
Start: 2025-06-04 | End: 2025-06-05

## 2025-06-02 RX ORDER — PHENOBARBITAL 32.4 MG/1
32.4 TABLET ORAL 4 TIMES DAILY
Status: COMPLETED | OUTPATIENT
Start: 2025-06-02 | End: 2025-06-03

## 2025-06-02 RX ORDER — PHENOBARBITAL 32.4 MG/1
16.2 TABLET ORAL EVERY 6 HOURS PRN
Status: DISCONTINUED | OUTPATIENT
Start: 2025-06-04 | End: 2025-06-05

## 2025-06-02 RX ORDER — PHENOBARBITAL 32.4 MG/1
64.8 TABLET ORAL 2 TIMES DAILY
Status: DISCONTINUED | OUTPATIENT
Start: 2025-06-03 | End: 2025-06-04

## 2025-06-02 RX ORDER — ACETAMINOPHEN 325 MG/1
650 TABLET ORAL EVERY 6 HOURS PRN
Status: DISCONTINUED | OUTPATIENT
Start: 2025-06-02 | End: 2025-06-05 | Stop reason: HOSPADM

## 2025-06-02 RX ORDER — SODIUM CHLORIDE 0.9 % (FLUSH) 0.9 %
5-40 SYRINGE (ML) INJECTION PRN
Status: DISCONTINUED | OUTPATIENT
Start: 2025-06-02 | End: 2025-06-05 | Stop reason: HOSPADM

## 2025-06-02 RX ORDER — CHLORDIAZEPOXIDE HYDROCHLORIDE 5 MG/1
10 CAPSULE, GELATIN COATED ORAL ONCE
Status: COMPLETED | OUTPATIENT
Start: 2025-06-02 | End: 2025-06-02

## 2025-06-02 RX ORDER — PHENOBARBITAL 32.4 MG/1
32.4 TABLET ORAL 2 TIMES DAILY
Status: DISCONTINUED | OUTPATIENT
Start: 2025-06-04 | End: 2025-06-04

## 2025-06-02 RX ORDER — POLYETHYLENE GLYCOL 3350 17 G/17G
17 POWDER, FOR SOLUTION ORAL DAILY PRN
Status: DISCONTINUED | OUTPATIENT
Start: 2025-06-02 | End: 2025-06-05 | Stop reason: HOSPADM

## 2025-06-02 RX ORDER — LEVOTHYROXINE SODIUM 25 UG/1
25 TABLET ORAL DAILY
Status: DISCONTINUED | OUTPATIENT
Start: 2025-06-03 | End: 2025-06-05 | Stop reason: HOSPADM

## 2025-06-02 RX ADMIN — PHENOBARBITAL 32.4 MG: 32.4 TABLET ORAL at 21:15

## 2025-06-02 RX ADMIN — PHENOBARBITAL SODIUM 730.6 MG: 130 INJECTION INTRAMUSCULAR; INTRAVENOUS at 15:53

## 2025-06-02 RX ADMIN — CHLORDIAZEPOXIDE HYDROCHLORIDE 10 MG: 5 CAPSULE ORAL at 14:47

## 2025-06-02 RX ADMIN — BUPRENORPHINE HYDROCHLORIDE AND NALOXONE HYDROCHLORIDE DIHYDRATE 1 TABLET: 8; 2 TABLET SUBLINGUAL at 12:50

## 2025-06-02 RX ADMIN — ACETAMINOPHEN 650 MG: 325 TABLET ORAL at 20:48

## 2025-06-02 RX ADMIN — SODIUM CHLORIDE, PRESERVATIVE FREE 10 ML: 5 INJECTION INTRAVENOUS at 20:49

## 2025-06-02 RX ADMIN — ONDANSETRON 4 MG: 2 INJECTION INTRAMUSCULAR; INTRAVENOUS at 20:48

## 2025-06-02 RX ADMIN — Medication 100 MG: at 16:21

## 2025-06-02 RX ADMIN — PHENOBARBITAL 32.4 MG: 32.4 TABLET ORAL at 20:49

## 2025-06-02 RX ADMIN — PHENOBARBITAL 32.4 MG: 32.4 TABLET ORAL at 17:34

## 2025-06-02 RX ADMIN — ONDANSETRON 4 MG: 4 TABLET, ORALLY DISINTEGRATING ORAL at 14:47

## 2025-06-02 RX ADMIN — ENOXAPARIN SODIUM 40 MG: 100 INJECTION SUBCUTANEOUS at 17:34

## 2025-06-02 ASSESSMENT — LIFESTYLE VARIABLES
HOW MANY STANDARD DRINKS CONTAINING ALCOHOL DO YOU HAVE ON A TYPICAL DAY: 1 OR 2
HOW OFTEN DO YOU HAVE A DRINK CONTAINING ALCOHOL: 2-4 TIMES A MONTH

## 2025-06-02 ASSESSMENT — PAIN SCALES - GENERAL: PAINLEVEL_OUTOF10: 0

## 2025-06-02 NOTE — PROGRESS NOTES
4 Eyes Skin Assessment     NAME:  Napoleon Plasencia  YOB: 1980  MEDICAL RECORD NUMBER:  [id number]    The patient is being assessed for  Admission    I agree that at least one RN has performed a thorough Head to Toe Skin Assessment on the patient. ALL assessment sites listed below have been assessed.      Areas assessed by both nurses:    Head, Face, Ears, Shoulders, Back, Chest, Arms, Elbows, Hands, Sacrum. Buttock, Coccyx, Ischium, Legs. Feet and Heels, and Under Medical Devices         Does the Patient have a Wound? No noted wound(s)       Roman Prevention initiated by RN: No  Wound Care Orders initiated by RN: No    Pressure Injury (Stage 3,4, Unstageable, DTI, NWPT, and Complex wounds) if present, place Wound referral order by RN under : No    New Ostomies, if present place, Ostomy referral order under : No     Nurse 1 eSignature: Electronically signed by Augusto Willams RN on 6/2/25 at 4:52 PM EDT    **SHARE this note so that the co-signing nurse can place an eSignature**    Nurse 2 eSignature: Electronically signed by Almita Thompson RN on 6/2/25 at 6:27 PM EDT

## 2025-06-02 NOTE — ED PROVIDER NOTES
3:57 PM: Assumed care of patient at signout. For more details, please see note from same day. Briefly, Napoleon Plasencia is a 45 y.o. male with a PMH significant for alcohol and opiate use disorder, who presents in the setting of acute alcohol intoxication endorsing suicidal ideation.     ED course summary:   - Hemodynamically stable.  Acutely intoxicated.  Endorsed SI.  - No medical complaints, reassuring medical examination.  Reassuring labs, medically cleared.    - Seen by psychiatry, recommended inpatient admission    Plan at time of sign-out:   - Pending acceptance to a psychiatric facility and transferred for further management.    ED course since sign-out:  Was informed by nursing staff that the patient is having significant tremors.  I assessed him.  He is hemodynamically stable, although now moderately hypertensive.  He is anxious, with tremors in all extremities, with myoclonus in all extremities and his tongue, with symptoms that he believes are consistent with alcohol withdrawal.  I agree; he is exhibiting symptoms of moderate alcohol withdrawal.  Indicated a CIWA score, and prescribed a load with phenobarbital to prevent progression to a severe and complicated withdrawal.  Prescribed vitamins, including thiamine and folic acid.  He has received a dose of chlordiazepoxide, but his symptoms were refractory to them.  The patient will be admitted to medicine for further management and active medical clearance, with plans for psychiatric management in the future.    BP (!) 179/89   Pulse 62   Temp 98.5 °F (36.9 °C) (Oral)   Resp 19   Ht 1.778 m (5' 10\")   Wt 74.8 kg (165 lb)   SpO2 95%   BMI 23.68 kg/m²   Labs:  Labs Reviewed   CBC - Abnormal; Notable for the following components:       Result Value    .7 (*)     MCH 36.1 (*)     All other components within normal limits   COMPREHENSIVE METABOLIC PANEL - Abnormal; Notable for the following components:    Anion Gap 20 (*)     Glucose 111 (*)

## 2025-06-02 NOTE — PROGRESS NOTES
\"Received request for Telepsychiatry evaluation.  Medical screening labs are still pending.  Our team will continue to follow and will initiate evaluation when more information is available and notes in the chart

## 2025-06-02 NOTE — PROGRESS NOTES
Patient arrived in ICU positioned in bed and connected to monitors. Patient is alert and oriented X4, /84, HR 72, rhythm is NSR, pulse Ox is 98% on room air, lung sounds are clear, breaths are even and unlabored. Bowel sounds are active. No wounds are observed.   CIWA is 13  Peripheral IVs are flushed per protocol.
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
## 2025-06-02 NOTE — H&P
History and Physical 25        NAME: Napoleon Plasencia  : 1980  MRN: [id number]      Assessment/Plan:  Napoleon Plasencia is a 45 y.o. male with a history of hypothyroidism and substance abuse who presented to [facility] 2025 with acute suicidal ideation    Acute suicidal ideation  Psychiatry consulted  Bedside sitter  Admitted to ICU  Remy slipped    Acute alcohol intoxication  CIWA protocol started  P.o. thiamine  Continue to monitor vitals    DVT Prophylaxis: Enoxaparin  Code Status/Surrogate Decision Maker: Full code      Current living situation: Home  Expected Disposition: Patient psych  Estimated discharge date: TBD      Chief Complaint:    Suicidal ideation    History of Present Illness:    45-year-old male presented to the emergency room with reports of suicidal ideation patient was evaluated by psychiatry and plan is to have the patient admitted to inpatient psych facility.  Patient has acute alcohol intoxication at this time has been admitted failed CIWA protocol    ROS:    Review of Systems     Review of systems is negative except as mentioned above     Past Medical, Surgical, Social, Family History:   Past Medical History:   Diagnosis Date    Addiction (HCC)     Thyroid disease      Past Surgical History:   Procedure Laterality Date    TUMOR EXCISION       Social History     Socioeconomic History    Marital status: Single     Spouse name: Not on file    Number of children: Not on file    Years of education: Not on file    Highest education level: Not on file   Occupational History    Not on file   Tobacco Use    Smoking status: Every Day     Current packs/day: 1.00     Types: Cigarettes    Smokeless tobacco: Never   Substance and Sexual Activity    Alcohol use: Not Currently     Comment: occ    Drug use: Not Currently    Sexual activity: Yes     Partners: Female   Other Topics Concern    Not on file   Social History Narrative    Not on file     Social Drivers of Health     Financial Resource

## 2025-06-02 NOTE — DISCHARGE INSTRUCTIONS
Addiction Support and Treatment Options [facility] REACH  Outpatient treatment for both men & women  [address]   [facility] [address]  [phone]    [address] [facility] [address]  [phone]    [facility]    [address] [facility] [address]  [phone]  Intensive Outpatient and Residential    [facility]        [address] [facility], [address]  [phone]    [facility]  [address], [place] [address]  [phone]    [place]  [address]   [place], [place] [address]  [phone]    [facility]  [address]  [place], [place] [address]  [phone]    [place]  [address], [place] [address]  [phone]    [facility]   (various other locations in [place]/ visit [email or web address])  [address] [address]  [phone]    [facility]   [address], ,  [facility], [id number] [phone]    [facility]   [address]  [place] [address]  [phone]      [facility]   [address] [place] [address]  [phone]    [facility]  [address] [place] [address]  [phone]    [facility]:   [address] [place] [address]  [phone]    [facility]  [address] [place] [address] [place] [address]  [phone]  [email or web address]    [address] [place], [place] [address] [phone]  Fax [phone]    [address] [facility] [address]  [place], [place] [id number] [phone]  Fax [phone]    [address] [place] [address]  [phone]  Fax [phone]     [facility]  [address] [address]  Main number- [phone]  [place]-[phone]

## 2025-06-02 NOTE — ED PROVIDER NOTES
Received signout from Dr. Dorado at the end of his shift.  Patient is a 45-year-old male brought in by police on an involuntary hold acutely intoxicated with concerns of suicidal ideation.  Patient has been guarded and denying any suicidal ideation.  Patient has a repeat alcohol level and will necessitate mental health evaluation once sober.    Repeat alcohol obtained and has significantly lowered to 120 from initial 409.    Patient reevaluated by telepsych and recommendation is for placement.    Patient is medically cleared.    Patient signed out at the end of my shift awaiting placement     Nesha Velasquez,   06/02/25 3238

## 2025-06-02 NOTE — CARE COORDINATION
CM received auto consult for patient in room #24 to assist with consideration for rehab. Per telepsych, patient requiring inpatient admission. Patient placed on Phenobarbital infusion while in the ER. CM placed addiction support and treatment options in AVS.
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
## 2025-06-02 NOTE — VIRTUAL HEALTH
Napoleon Plasencia  [id number]  1980     EMERGENCY DEPARTMENT TELEPSYCHIATRY EVALUATION    06/01/25    Chief Complaint:  “Per police, making suicidal statements to girlfriend”    Pt currently on involuntary hold via police due to several text messages sent to girl friend threatening to slit wrist, as well as several other suicidal statements.  Police initiated pink slip after reviewing text message made by patient.    HPI: Patient is a 45 y.o.  male who presents for suicidal statements. Patient presented to the ED on 06/01/25 from home.    States brought to ED due to statements being suicidal however pt denies any claims.    States people were giving him heard time about the way he was living life, states they reported he was seeing things and making states of suicide via Vy    Denies SI HI AVH; devoid of any delusions    Occasionally drink alcohol, states clean nearly 10 years.  Remains on suboxone via Vy.  Patient denies any alcohol or drug use today outside of prescribed Suboxone.    Denies mood dysregulation, states the women in his home wife and 3 daughters, states they give him a hard time    Reports he goes back and forth between girlfriends and moms house.    Patient actively minimizing all behaviors leading to Emergency Department presentation continues to deny any previous suicidal statements were made.    Past Psychiatric History:  Previous diagnoses/symptoms: Depression  Self-injurious behavior/risky thoughts or behaviors (past suicidal ideation/attempt): Denies  Violence/Risk to others (past homicidal ideation/attempt): Yes  Previous inpatient psychiatric hospitalizations: Yes when he was a teenager  Current outpatient psychiatric provider: none  Current therapist: none  Previous psychiatric medication trials: zoloft  Current meds: zoloft wellburtin  History of ECT: Unknown    Substance Abuse History:  Tobacco: Endorses but trying to quite  Caffeine: Endorses two cups a  guardian's) consent. Patient identification was verified, and a caregiver was present when appropriate.  The patient was located at Facility (Appt Department): [facility]PART[facility]  [facility] [address]  [place] [id number]  Loc: [phone]  The provider was located at Home (City/State): [facility]  Confirm you are appropriately licensed, registered, or certified to deliver care in the state where the patient is located as indicated above. If you are not or unsure, please re-schedule the visit: Yes, I confirm.   [place] Consult to Tele-Psych  Consult performed by: Tucker Crockett APRN - CNP  Consult ordered by: Neo Dorado DO  Reason for consult: psych eval           Total time spent on this encounter:  60 minutes    --JES Del Real CNP on 6/1/2025 at 10:55 PM    An electronic signature was used to authenticate this note.

## 2025-06-03 LAB
ALBUMIN SERPL-MCNC: 3.8 G/DL (ref 3.4–5)
ALBUMIN/GLOB SERPL: 1.5 {RATIO} (ref 1.1–2.2)
ALP SERPL-CCNC: 168 U/L (ref 40–129)
ALT SERPL-CCNC: 117 U/L (ref 10–40)
ANION GAP SERPL CALCULATED.3IONS-SCNC: 12 MMOL/L (ref 9–17)
AST SERPL-CCNC: 141 U/L (ref 15–37)
BASOPHILS # BLD: 0.05 K/UL
BASOPHILS NFR BLD: 1 % (ref 0–1)
BILIRUB SERPL-MCNC: 1.7 MG/DL (ref 0–1)
BUN SERPL-MCNC: 12 MG/DL (ref 7–20)
CALCIUM SERPL-MCNC: 9.5 MG/DL (ref 8.3–10.6)
CHLORIDE SERPL-SCNC: 96 MMOL/L (ref 99–110)
CO2 SERPL-SCNC: 27 MMOL/L (ref 21–32)
CREAT SERPL-MCNC: 0.7 MG/DL (ref 0.9–1.3)
EOSINOPHIL # BLD: 0.07 K/UL
EOSINOPHILS RELATIVE PERCENT: 1 % (ref 0–3)
ERYTHROCYTE [DISTWIDTH] IN BLOOD BY AUTOMATED COUNT: 11.4 % (ref 11.7–14.9)
GFR, ESTIMATED: >90 ML/MIN/1.73M2
GLUCOSE SERPL-MCNC: 93 MG/DL (ref 74–99)
HCT VFR BLD AUTO: 44.6 % (ref 42–52)
HGB BLD-MCNC: 15.3 G/DL (ref 13.5–18)
IMM GRANULOCYTES # BLD AUTO: 0.01 K/UL
IMM GRANULOCYTES NFR BLD: 0 %
LYMPHOCYTES NFR BLD: 1.5 K/UL
LYMPHOCYTES RELATIVE PERCENT: 30 % (ref 24–44)
MCH RBC QN AUTO: 35.5 PG (ref 27–31)
MCHC RBC AUTO-ENTMCNC: 34.3 G/DL (ref 32–36)
MCV RBC AUTO: 103.5 FL (ref 78–100)
MONOCYTES NFR BLD: 0.5 K/UL
MONOCYTES NFR BLD: 10 % (ref 0–5)
NEUTROPHILS NFR BLD: 57 % (ref 36–66)
NEUTS SEG NFR BLD: 2.81 K/UL
PLATELET # BLD AUTO: 145 K/UL (ref 140–440)
PMV BLD AUTO: 10.5 FL (ref 7.5–11.1)
POTASSIUM SERPL-SCNC: 3.8 MMOL/L (ref 3.5–5.1)
PROT SERPL-MCNC: 6.4 G/DL (ref 6.4–8.2)
RBC # BLD AUTO: 4.31 M/UL (ref 4.6–6.2)
SODIUM SERPL-SCNC: 135 MMOL/L (ref 136–145)
WBC OTHER # BLD: 4.9 K/UL (ref 4–10.5)

## 2025-06-03 PROCEDURE — 6370000000 HC RX 637 (ALT 250 FOR IP): Performed by: STUDENT IN AN ORGANIZED HEALTH CARE EDUCATION/TRAINING PROGRAM

## 2025-06-03 PROCEDURE — 2500000003 HC RX 250 WO HCPCS: Performed by: STUDENT IN AN ORGANIZED HEALTH CARE EDUCATION/TRAINING PROGRAM

## 2025-06-03 PROCEDURE — 6360000002 HC RX W HCPCS: Performed by: STUDENT IN AN ORGANIZED HEALTH CARE EDUCATION/TRAINING PROGRAM

## 2025-06-03 PROCEDURE — 6370000000 HC RX 637 (ALT 250 FOR IP): Performed by: NURSE PRACTITIONER

## 2025-06-03 PROCEDURE — 80053 COMPREHEN METABOLIC PANEL: CPT

## 2025-06-03 PROCEDURE — 1200000000 HC SEMI PRIVATE

## 2025-06-03 PROCEDURE — 85025 COMPLETE CBC W/AUTO DIFF WBC: CPT

## 2025-06-03 PROCEDURE — 99223 1ST HOSP IP/OBS HIGH 75: CPT | Performed by: NURSE PRACTITIONER

## 2025-06-03 PROCEDURE — 94761 N-INVAS EAR/PLS OXIMETRY MLT: CPT

## 2025-06-03 PROCEDURE — 36415 COLL VENOUS BLD VENIPUNCTURE: CPT

## 2025-06-03 RX ORDER — NICOTINE 21 MG/24HR
1 PATCH, TRANSDERMAL 24 HOURS TRANSDERMAL DAILY
Status: DISCONTINUED | OUTPATIENT
Start: 2025-06-03 | End: 2025-06-05 | Stop reason: HOSPADM

## 2025-06-03 RX ORDER — HYDROCHLOROTHIAZIDE 25 MG/1
25 TABLET ORAL ONCE
Status: COMPLETED | OUTPATIENT
Start: 2025-06-03 | End: 2025-06-03

## 2025-06-03 RX ORDER — BUPROPION HYDROCHLORIDE 75 MG/1
75 TABLET ORAL DAILY
Status: ON HOLD | COMMUNITY
End: 2025-06-05 | Stop reason: HOSPADM

## 2025-06-03 RX ADMIN — ONDANSETRON 4 MG: 2 INJECTION INTRAMUSCULAR; INTRAVENOUS at 03:52

## 2025-06-03 RX ADMIN — SODIUM CHLORIDE, PRESERVATIVE FREE 10 ML: 5 INJECTION INTRAVENOUS at 09:29

## 2025-06-03 RX ADMIN — ENOXAPARIN SODIUM 40 MG: 100 INJECTION SUBCUTANEOUS at 09:29

## 2025-06-03 RX ADMIN — ONDANSETRON 4 MG: 2 INJECTION INTRAMUSCULAR; INTRAVENOUS at 18:58

## 2025-06-03 RX ADMIN — Medication 100 MG: at 09:29

## 2025-06-03 RX ADMIN — SODIUM CHLORIDE, PRESERVATIVE FREE 10 ML: 5 INJECTION INTRAVENOUS at 20:35

## 2025-06-03 RX ADMIN — PHENOBARBITAL 32.4 MG: 32.4 TABLET ORAL at 13:08

## 2025-06-03 RX ADMIN — SODIUM CHLORIDE, PRESERVATIVE FREE 10 ML: 5 INJECTION INTRAVENOUS at 20:36

## 2025-06-03 RX ADMIN — PHENOBARBITAL 32.4 MG: 32.4 TABLET ORAL at 03:52

## 2025-06-03 RX ADMIN — PHENOBARBITAL 32.4 MG: 32.4 TABLET ORAL at 20:42

## 2025-06-03 RX ADMIN — PHENOBARBITAL 32.4 MG: 32.4 TABLET ORAL at 09:28

## 2025-06-03 RX ADMIN — HYDROCHLOROTHIAZIDE 25 MG: 25 TABLET ORAL at 03:52

## 2025-06-03 RX ADMIN — PHENOBARBITAL 64.8 MG: 32.4 TABLET ORAL at 09:28

## 2025-06-03 RX ADMIN — LEVOTHYROXINE SODIUM 25 MCG: 0.03 TABLET ORAL at 05:34

## 2025-06-03 RX ADMIN — BUPRENORPHINE HYDROCHLORIDE AND NALOXONE HYDROCHLORIDE DIHYDRATE 1 TABLET: 8; 2 TABLET SUBLINGUAL at 09:29

## 2025-06-03 RX ADMIN — ACETAMINOPHEN 650 MG: 325 TABLET ORAL at 03:52

## 2025-06-03 RX ADMIN — PHENOBARBITAL 32.4 MG: 32.4 TABLET ORAL at 23:37

## 2025-06-03 ASSESSMENT — PAIN SCALES - GENERAL
PAINLEVEL_OUTOF10: 0

## 2025-06-03 NOTE — PLAN OF CARE
Problem: Discharge Planning  Goal: Discharge to home or other facility with appropriate resources  6/3/2025 0821 by ePrri Lugo, RN  Outcome: Progressing  6/2/2025 2141 by Kaylin Beasley RN  Outcome: Progressing  Flowsheets (Taken 6/2/2025 2000)  Discharge to home or other facility with appropriate resources:   Identify barriers to discharge with patient and caregiver   Arrange for needed discharge resources and transportation as appropriate   Identify discharge learning needs (meds, wound care, etc)     Problem: Safety - Adult  Goal: Free from fall injury  6/3/2025 0821 by Perri Lugo, RN  Outcome: Progressing  6/2/2025 2141 by Kaylin Beasley RN  Outcome: Progressing

## 2025-06-03 NOTE — CARE COORDINATION
Psych eval reviewed and recommendation for inpatient psych on an involuntary basis noted.     When medically stable for Access Center Transfer please have...  1) Statement of medical stability in attending note for IP Psych  2) Pink slip (when placement/bed is located)  3) Covid Test within 24 hours if the pt has symptoms or signs of Covid during their stay    Alert CM staff when statement of medical stability and ADT20 order are placed so that CM can complete the transfer checklist to start access center transfer.

## 2025-06-03 NOTE — PLAN OF CARE
Problem: Discharge Planning  Goal: Discharge to home or other facility with appropriate resources  Outcome: Progressing  Flowsheets (Taken 6/2/2025 2000)  Discharge to home or other facility with appropriate resources:   Identify barriers to discharge with patient and caregiver   Arrange for needed discharge resources and transportation as appropriate   Identify discharge learning needs (meds, wound care, etc)     Problem: Safety - Adult  Goal: Free from fall injury  Outcome: Progressing   Progressing per plan of care at this time. Patient cooperative, and voices his symptoms per CIWA during assessment. Will continue to monitor. Sitter at bedside

## 2025-06-03 NOTE — CARE COORDINATION
Transfer Center Handoff for Behavioral Health Transfers      Patient's Current Location: [facility] ICU     Chief Complaint   Patient presents with    Mental Health Problem     Police report pt made suicidal comments via text to  ex girlfriend       Current or History of Violent Behavior: No    Currently in Restraints Now or During this Encounter: No  (Specify if Agitation or self harm is noted in ED?)  If yes, please describe behaviors requiring restraint:             Medical Clearance Documented and Verified in the Chart: Yes    Allergies   Allergen Reactions    Nsaids Swelling        Can Patient Tolerate Lying Flat: Yes    Able to Perform ADLs:  Yes  (Specify if able to ambulate or uses any mobility devices such as cane or walker)  Activity: In bed  Level of Assistance:    Assistive Device:    Miscellaneous Devices:      LABS    CBC:   Lab Results   Component Value Date/Time    WBC 4.9 06/03/2025 08:15 AM    RBC 4.31 06/03/2025 08:15 AM    HGB 15.3 06/03/2025 08:15 AM    HCT 44.6 06/03/2025 08:15 AM    .5 06/03/2025 08:15 AM    MCH 35.5 06/03/2025 08:15 AM    MCHC 34.3 06/03/2025 08:15 AM    RDW 11.4 06/03/2025 08:15 AM     06/03/2025 08:15 AM    MPV 10.5 06/03/2025 08:15 AM     CMP:   Lab Results   Component Value Date/Time     06/03/2025 08:15 AM    K 3.8 06/03/2025 08:15 AM    CL 96 06/03/2025 08:15 AM    CO2 27 06/03/2025 08:15 AM    BUN 12 06/03/2025 08:15 AM    CREATININE 0.7 06/03/2025 08:15 AM    LABGLOM >90 06/03/2025 08:15 AM    LABGLOM >60 01/05/2024 11:30 PM    GLUCOSE 93 06/03/2025 08:15 AM    CALCIUM 9.5 06/03/2025 08:15 AM    BILITOT 1.7 06/03/2025 08:15 AM    ALKPHOS 168 06/03/2025 08:15 AM     06/03/2025 08:15 AM     06/03/2025 08:15 AM     Drug Panel:   Lab Results   Component Value Date/Time    OPIAU NEGATIVE 06/01/2025 10:30 PM     UA:  Lab Results   Component Value Date/Time    COLORU YELLOW 01/06/2024 12:22 AM    PROTEINU NEGATIVE 01/06/2024 12:22 AM    GLUCOSEU

## 2025-06-03 NOTE — PROGRESS NOTES
Hospital Medicine Progress Note  V 5.17      Date of Admission: 6/1/2025    Hospital Day: 3      Chief Admission Complaint: Suicidal ideation    Subjective:      Patient still has mild tremor.  Will continue phenobarb taper.  Patient states that he has had withdrawal but no seizure    Presenting Admission History:       45-year-old male presented to the emergency room with reports of suicidal ideation patient was evaluated by psychiatry and plan is to have the patient admitted to inpatient psych facility. Patient has acute alcohol intoxication at this time has been admitted failed [facility] protocol     Assessment/Plan:      Alcohol abuse with withdrawal  Patient states that he has had withdrawal but no seizure  Phenobarbital taper  Thiamine  P.o. intake    Suicidal ideation  Currently pink slipped  Psychiatry consulted    Ongoing threat to life and/or bodily function without ongoing treatment due to: Alcohol withdrawal    Consults:      IP CONSULT TO TELE-PSYCH (SOCIAL WORK ONLY)  IP CONSULT TO TELE-PSYCH (SOCIAL WORK ONLY)  IP CONSULT TO SOCIAL WORK  IP CONSULT TO SOCIAL WORK  IP CONSULT TO PSYCHIATRY        --------------------------------------------------      Medications:        Infusion Medications    sodium chloride      sodium chloride       Scheduled Medications    buprenorphine-naloxone  1 tablet SubLINGual Daily    scopolamine  1 patch TransDERmal Once    PHENobarbital  64.8 mg Oral BID    Followed by    [START ON 6/4/2025] PHENobarbital  32.4 mg Oral BID    Followed by    [START ON 6/5/2025] PHENobarbital  32.4 mg Oral Daily    sodium chloride flush  5-40 mL IntraVENous 2 times per day    thiamine  100 mg Oral Daily    sodium chloride flush  5-40 mL IntraVENous 2 times per day    thiamine  100 mg Oral Daily    enoxaparin  40 mg SubCUTAneous Daily    PHENobarbital  32.4 mg Oral 4x daily    Followed by    PHENobarbital  32.4 mg Oral BID    Followed by    [START ON 6/4/2025] PHENobarbital  16.2 mg Oral  hours.    Urine Cultures: No results found for: \"LABURIN\"  Blood Cultures: No results found for: \"BC\"  No results found for: \"BLOODCULT2\"  Organism: No results found for: \"ORG\"      Gilles Shannon MD

## 2025-06-03 NOTE — CONSULTS
Initial Psychiatric History and Physical    Napoleon NIRAJ Plasencia  [id number]  6/1/2025 06/03/25    ID: Patient is a 45 yrs y.o. male    CC:suicidal ideation    HPI: Patient is a 45 year old male with pmhx of hypothyroidism, alcohol use, opiate use on Suboxone, who presents to [facility] ED via police and placed on a psychiatric hold after ex GF showed police text messages that\"he was threatening to cut himself and threatening to commit suicide by \".  Telepsychiatry saw patient in the Ed and recommended inpatient psychiatric hospitalization. They noted he is having a difficult time with his partner and 3 daughters in his home. Patient currently denies SI to staff. Per re evaluation with Telepsychiatry patient did admit to making SI statements with plan of suicide by . Patient was admitted for alcohol w/d and currently receiving phenobarb taper. Psychiatry consulted by Dr Singh due to \"suicidal ideation.\"      Per Telepsychiatry: Collateral information obtained from patient's girlfriend, Shaina ([phone]): States she has concerns due to patient's recent behaviors and increased alcohol use. States patient has been more irritable and angrier lately. States that they were arguing last night regarding her wanting the patient to quit drinking and go to rehab. States that the patient was yelling at her and the kids so she called the  due to concerns for their safety. Reports patient made multiple threats of ending his life. States patient has been drinking daily and does not think he has been compliant with psychiatric medications.       Met with patient at bedside. He notes he \"feels better\" and is alert and oriented x 4. He remembers sending texts and notes, \" we were just arguing.\" \"Notes it was my birthday and I was celebrating!\"  Patient does admit to sending texts  with suicidal ideation. Currently denies. Discussed psychiatric hospitalization once he is medically appropriate. [facility] judgment

## 2025-06-04 PROCEDURE — 6360000002 HC RX W HCPCS: Performed by: STUDENT IN AN ORGANIZED HEALTH CARE EDUCATION/TRAINING PROGRAM

## 2025-06-04 PROCEDURE — 2500000003 HC RX 250 WO HCPCS: Performed by: STUDENT IN AN ORGANIZED HEALTH CARE EDUCATION/TRAINING PROGRAM

## 2025-06-04 PROCEDURE — 6370000000 HC RX 637 (ALT 250 FOR IP): Performed by: STUDENT IN AN ORGANIZED HEALTH CARE EDUCATION/TRAINING PROGRAM

## 2025-06-04 PROCEDURE — 6370000000 HC RX 637 (ALT 250 FOR IP)

## 2025-06-04 PROCEDURE — 1200000000 HC SEMI PRIVATE

## 2025-06-04 PROCEDURE — 94761 N-INVAS EAR/PLS OXIMETRY MLT: CPT

## 2025-06-04 RX ORDER — HYDROXYZINE HYDROCHLORIDE 25 MG/1
25 TABLET, FILM COATED ORAL ONCE
Status: COMPLETED | OUTPATIENT
Start: 2025-06-04 | End: 2025-06-04

## 2025-06-04 RX ADMIN — ACETAMINOPHEN 650 MG: 325 TABLET ORAL at 19:48

## 2025-06-04 RX ADMIN — POLYETHYLENE GLYCOL (3350) 17 G: 17 POWDER, FOR SOLUTION ORAL at 19:48

## 2025-06-04 RX ADMIN — Medication 100 MG: at 07:49

## 2025-06-04 RX ADMIN — BUPRENORPHINE HYDROCHLORIDE AND NALOXONE HYDROCHLORIDE DIHYDRATE 1 TABLET: 8; 2 TABLET SUBLINGUAL at 07:49

## 2025-06-04 RX ADMIN — ONDANSETRON 4 MG: 2 INJECTION INTRAMUSCULAR; INTRAVENOUS at 09:07

## 2025-06-04 RX ADMIN — SODIUM CHLORIDE, PRESERVATIVE FREE 10 ML: 5 INJECTION INTRAVENOUS at 09:11

## 2025-06-04 RX ADMIN — SODIUM CHLORIDE, PRESERVATIVE FREE 10 ML: 5 INJECTION INTRAVENOUS at 19:49

## 2025-06-04 RX ADMIN — LEVOTHYROXINE SODIUM 25 MCG: 0.03 TABLET ORAL at 05:41

## 2025-06-04 RX ADMIN — PHENOBARBITAL 32.4 MG: 32.4 TABLET ORAL at 07:49

## 2025-06-04 RX ADMIN — ENOXAPARIN SODIUM 40 MG: 100 INJECTION SUBCUTANEOUS at 07:48

## 2025-06-04 RX ADMIN — ONDANSETRON 4 MG: 2 INJECTION INTRAMUSCULAR; INTRAVENOUS at 02:59

## 2025-06-04 RX ADMIN — PHENOBARBITAL 32.4 MG: 32.4 TABLET ORAL at 14:09

## 2025-06-04 RX ADMIN — HYDROXYZINE HYDROCHLORIDE 25 MG: 25 TABLET ORAL at 02:59

## 2025-06-04 RX ADMIN — PHENOBARBITAL 16.2 MG: 32.4 TABLET ORAL at 19:49

## 2025-06-04 RX ADMIN — ONDANSETRON 4 MG: 2 INJECTION INTRAMUSCULAR; INTRAVENOUS at 17:53

## 2025-06-04 ASSESSMENT — PAIN DESCRIPTION - DESCRIPTORS
DESCRIPTORS: ACHING
DESCRIPTORS: ACHING

## 2025-06-04 ASSESSMENT — PAIN SCALES - GENERAL
PAINLEVEL_OUTOF10: 6
PAINLEVEL_OUTOF10: 1
PAINLEVEL_OUTOF10: 6

## 2025-06-04 ASSESSMENT — PAIN DESCRIPTION - ORIENTATION
ORIENTATION: LEFT
ORIENTATION: LEFT

## 2025-06-04 ASSESSMENT — PAIN DESCRIPTION - FREQUENCY: FREQUENCY: CONTINUOUS

## 2025-06-04 ASSESSMENT — PAIN DESCRIPTION - LOCATION
LOCATION: HEAD
LOCATION: HEAD

## 2025-06-04 ASSESSMENT — PAIN DESCRIPTION - PAIN TYPE: TYPE: ACUTE PAIN

## 2025-06-04 ASSESSMENT — PAIN DESCRIPTION - ONSET: ONSET: GRADUAL

## 2025-06-04 NOTE — PROGRESS NOTES
Bradycardic, asymptomatic, easy to arouse, denies symptoms of bradycardic. Will continue to monitor

## 2025-06-04 NOTE — PLAN OF CARE
Problem: Discharge Planning  Goal: Discharge to home or other facility with appropriate resources  6/3/2025 2103 by Kaylin Beasley RN  Outcome: Progressing  Flowsheets (Taken 6/3/2025 2000)  Discharge to home or other facility with appropriate resources:   Identify barriers to discharge with patient and caregiver   Arrange for needed discharge resources and transportation as appropriate   Identify discharge learning needs (meds, wound care, etc)  6/3/2025 0821 by Perri Lugo RN  Outcome: Progressing     Problem: Safety - Adult  Goal: Free from fall injury  6/3/2025 2103 by Kyalin Beasley RN  Outcome: Progressing  6/3/2025 0821 by Perri Lugo, RN  Outcome: Progressing     Problem: Risk for Elopement  Goal: Patient will not exit the unit/facility without proper excort  Outcome: Progressing  Flowsheets (Taken 6/3/2025 2000)  Nursing Interventions for Elopement Risk:   Assist with personal care needs such as toileting, eating, dressing, as needed to reduce the risk of wandering   Reduce environmental triggers   Make sure patient has all necessary personal care items   Collaborate with treatment team for drug withdrawal symptoms treatment

## 2025-06-04 NOTE — PROGRESS NOTES
Hospital Medicine Progress Note  V 5.17      Date of Admission: 6/1/2025    Hospital Day: 4      Chief Admission Complaint: Suicidal ideation    Subjective:      Patient in no acute distress today.  Tremor still present.  Plan for completion of his phenobarb taper and then discharged inpatient psych.    Presenting Admission History:       45-year-old male presented to the emergency room with reports of suicidal ideation patient was evaluated by psychiatry and plan is to have the patient admitted to inpatient psych facility. Patient has acute alcohol intoxication at this time has been admitted failed [facility] protocol     Assessment/Plan:      Alcohol abuse with withdrawal  Patient states that he has had withdrawal but no seizure  Phenobarbital taper  Thiamine  P.o. intake    Suicidal ideation  Currently pink slipped  Psychiatry consulted    Ongoing threat to life and/or bodily function without ongoing treatment due to: Alcohol withdrawal     Consults:      IP CONSULT TO TELE-PSYCH (SOCIAL WORK ONLY)  IP CONSULT TO TELE-PSYCH (SOCIAL WORK ONLY)  IP CONSULT TO SOCIAL WORK  IP CONSULT TO SOCIAL WORK  IP CONSULT TO PSYCHIATRY        --------------------------------------------------      Medications:        Infusion Medications    sodium chloride      sodium chloride       Scheduled Medications    nicotine  1 patch TransDERmal Daily    buprenorphine-naloxone  1 tablet SubLINGual Daily    scopolamine  1 patch TransDERmal Once    PHENobarbital  32.4 mg Oral BID    Followed by    [START ON 6/5/2025] PHENobarbital  32.4 mg Oral Daily    sodium chloride flush  5-40 mL IntraVENous 2 times per day    thiamine  100 mg Oral Daily    sodium chloride flush  5-40 mL IntraVENous 2 times per day    enoxaparin  40 mg SubCUTAneous Daily    PHENobarbital  16.2 mg Oral BID    levothyroxine  25 mcg Oral Daily     PRN Meds: sodium chloride flush, sodium chloride, sodium chloride flush, sodium chloride, potassium chloride **OR** potassium    [] Aggressive IV diuresis    [] Hypertonic Saline    [] Critical electrolyte abnormalities requiring IV replacement    [] Insulin - Scheduled/SSI or Insulin gtt    [] Anticoagulation (Heparin gtt or Coumadin - other anticoagulants in special circumstances)    [] Cardiac Medications (IV Amiodarone/Diltiazem, Tikosyn, etc)    [] Hemodialysis    [] Other -    [] Change in code status    [] Decision to escalate care    [] Major surgery/procedure with associated risk factors    --------------------------------------------------  C. Data (any 2)    [] Data Review (any 3)    [] Consultant/subspecialist notes from yesterday/today    [x] All available current labs reviewed interpreted for clinical significance    [x] Appropriate follow-up labs were ordered  [] Collateral history obtained     [] Independent Interpretation of tests (any 1)    [] Telemetry (Rhythm Strip) personally reviewed and interpreted        [] Imaging personally reviewed and interpreted     [x] Discussion (any 1)  [x] Multi-Disciplinary Rounds with Case Management  [] Discussed management of the case with           Labs:  Personally reviewed on 6/4/2025 and interpreted for clinical significance as documented above.     Recent Labs     06/01/25 2140 06/03/25  0815   WBC 6.7 4.9   HGB 17.2 15.3   HCT 50.4 44.6   PLT  --  145     Recent Labs     06/01/25 2140 06/03/25  0815    135*   K 4.0 3.8    96*   CO2 21 27   BUN 9 12   CREATININE 0.7* 0.7*   CALCIUM 10.0 9.5     No results for input(s): \"PROBNP\", \"TROPHS\" in the last 72 hours.  No results for input(s): \"LABA1C\" in the last 72 hours.  Recent Labs     06/01/25 2140 06/03/25  0815   * 141*   * 117*   BILITOT 1.0 1.7*   ALKPHOS 220* 168*     No results for input(s): \"INR\", \"LACTA\", \"TSH\" in the last 72 hours.    Urine Cultures: No results found for: \"LABURIN\"  Blood Cultures: No results found for: \"BC\"  No results found for: \"BLOODCULT2\"  Organism: No results found for:  no

## 2025-06-04 NOTE — PLAN OF CARE
Problem: Discharge Planning  Goal: Discharge to home or other facility with appropriate resources  6/4/2025 0851 by Reji Cullen RN  Outcome: Progressing  6/3/2025 2103 by Kaylin Beasley RN  Outcome: Progressing  Flowsheets (Taken 6/3/2025 2000)  Discharge to home or other facility with appropriate resources:   Identify barriers to discharge with patient and caregiver   Arrange for needed discharge resources and transportation as appropriate   Identify discharge learning needs (meds, wound care, etc)     Problem: Safety - Adult  Goal: Free from fall injury  6/4/2025 0851 by Reji Cullen RN  Outcome: Progressing  Flowsheets (Taken 6/3/2025 2315 by Kaylin Beasley, RN)  Free From Fall Injury:   Instruct family/caregiver on patient safety   Based on caregiver fall risk screen, instruct family/caregiver to ask for assistance with transferring infant if caregiver noted to have fall risk factors  6/3/2025 2103 by Kaylin Beasley RN  Outcome: Progressing     Problem: Risk for Elopement  Goal: Patient will not exit the unit/facility without proper excort  6/4/2025 0851 by Reji Cullen RN  Outcome: Progressing  Flowsheets (Taken 6/4/2025 0800)  Nursing Interventions for Elopement Risk:   Assist with personal care needs such as toileting, eating, dressing, as needed to reduce the risk of wandering   Reduce environmental triggers   Make sure patient has all necessary personal care items   Collaborate with treatment team for drug withdrawal symptoms treatment  6/3/2025 2103 by Kaylin Beasley RN  Outcome: Progressing  Flowsheets (Taken 6/3/2025 2000)  Nursing Interventions for Elopement Risk:   Assist with personal care needs such as toileting, eating, dressing, as needed to reduce the risk of wandering   Reduce environmental triggers   Make sure patient has all necessary personal care items   Collaborate with treatment team for drug withdrawal symptoms treatment

## 2025-06-05 VITALS
TEMPERATURE: 98 F | SYSTOLIC BLOOD PRESSURE: 142 MMHG | HEART RATE: 55 BPM | DIASTOLIC BLOOD PRESSURE: 91 MMHG | RESPIRATION RATE: 16 BRPM | BODY MASS INDEX: 20.36 KG/M2 | OXYGEN SATURATION: 95 % | WEIGHT: 142.2 LBS | HEIGHT: 70 IN

## 2025-06-05 LAB
AMPHET UR QL SCN: POSITIVE
BARBITURATES UR QL SCN: POSITIVE
BENZODIAZ UR QL: POSITIVE
BILIRUB UR QL STRIP: ABNORMAL
CANNABINOIDS UR QL SCN: POSITIVE
CHARACTER UR: ABNORMAL
CLARITY UR: CLEAR
COCAINE UR QL SCN: NEGATIVE
COLOR UR: YELLOW
FENTANYL UR QL: NEGATIVE
GLUCOSE UR STRIP-MCNC: 100 MG/DL
HGB UR QL STRIP.AUTO: NEGATIVE
KETONES UR STRIP-MCNC: NEGATIVE MG/DL
LEUKOCYTE ESTERASE UR QL STRIP: NEGATIVE
NITRITE UR QL STRIP: NEGATIVE
OPIATES UR QL SCN: NEGATIVE
OXYCODONE UR QL SCN: NEGATIVE
PH UR STRIP: 6.5 [PH] (ref 5–8)
PROT UR STRIP-MCNC: ABNORMAL MG/DL
RBC #/AREA URNS HPF: <1 /HPF (ref 0–2)
SP GR UR STRIP: <1.005 (ref 1–1.03)
TEST INFORMATION: ABNORMAL
UROBILINOGEN UR STRIP-ACNC: >8 EU/DL (ref 0–1)
WBC #/AREA URNS HPF: 1 /HPF (ref 0–5)

## 2025-06-05 PROCEDURE — 99232 SBSQ HOSP IP/OBS MODERATE 35: CPT | Performed by: NURSE PRACTITIONER

## 2025-06-05 PROCEDURE — 2500000003 HC RX 250 WO HCPCS: Performed by: STUDENT IN AN ORGANIZED HEALTH CARE EDUCATION/TRAINING PROGRAM

## 2025-06-05 PROCEDURE — 6370000000 HC RX 637 (ALT 250 FOR IP): Performed by: STUDENT IN AN ORGANIZED HEALTH CARE EDUCATION/TRAINING PROGRAM

## 2025-06-05 PROCEDURE — 6360000002 HC RX W HCPCS: Performed by: STUDENT IN AN ORGANIZED HEALTH CARE EDUCATION/TRAINING PROGRAM

## 2025-06-05 PROCEDURE — 6360000002 HC RX W HCPCS: Performed by: NURSE PRACTITIONER

## 2025-06-05 PROCEDURE — 94761 N-INVAS EAR/PLS OXIMETRY MLT: CPT

## 2025-06-05 PROCEDURE — 81001 URINALYSIS AUTO W/SCOPE: CPT

## 2025-06-05 PROCEDURE — 80307 DRUG TEST PRSMV CHEM ANLYZR: CPT

## 2025-06-05 RX ORDER — BUPRENORPHINE HYDROCHLORIDE AND NALOXONE HYDROCHLORIDE DIHYDRATE 8; 2 MG/1; MG/1
2 TABLET SUBLINGUAL DAILY
Status: DISCONTINUED | OUTPATIENT
Start: 2025-06-06 | End: 2025-06-05 | Stop reason: HOSPADM

## 2025-06-05 RX ORDER — BUPRENORPHINE HYDROCHLORIDE AND NALOXONE HYDROCHLORIDE DIHYDRATE 8; 2 MG/1; MG/1
1 TABLET SUBLINGUAL ONCE
Status: COMPLETED | OUTPATIENT
Start: 2025-06-05 | End: 2025-06-05

## 2025-06-05 RX ORDER — BUPRENORPHINE HYDROCHLORIDE AND NALOXONE HYDROCHLORIDE DIHYDRATE 8; 2 MG/1; MG/1
2 TABLET SUBLINGUAL DAILY
Qty: 28 TABLET | Refills: 0 | Status: SHIPPED | OUTPATIENT
Start: 2025-06-06 | End: 2025-06-05

## 2025-06-05 RX ORDER — BUPRENORPHINE HYDROCHLORIDE AND NALOXONE HYDROCHLORIDE DIHYDRATE 8; 2 MG/1; MG/1
2 TABLET SUBLINGUAL DAILY
Qty: 14 TABLET | Refills: 0
Start: 2025-06-06 | End: 2025-06-13

## 2025-06-05 RX ORDER — LANOLIN ALCOHOL/MO/W.PET/CERES
100 CREAM (GRAM) TOPICAL DAILY
Qty: 30 TABLET | Refills: 3 | Status: SHIPPED | OUTPATIENT
Start: 2025-06-06

## 2025-06-05 RX ADMIN — ONDANSETRON 4 MG: 2 INJECTION INTRAMUSCULAR; INTRAVENOUS at 00:07

## 2025-06-05 RX ADMIN — BUPRENORPHINE HYDROCHLORIDE AND NALOXONE HYDROCHLORIDE DIHYDRATE 1 TABLET: 8; 2 TABLET SUBLINGUAL at 08:25

## 2025-06-05 RX ADMIN — ONDANSETRON 4 MG: 2 INJECTION INTRAMUSCULAR; INTRAVENOUS at 06:50

## 2025-06-05 RX ADMIN — ENOXAPARIN SODIUM 40 MG: 100 INJECTION SUBCUTANEOUS at 08:24

## 2025-06-05 RX ADMIN — SODIUM CHLORIDE, PRESERVATIVE FREE 10 ML: 5 INJECTION INTRAVENOUS at 08:25

## 2025-06-05 RX ADMIN — BUPRENORPHINE HYDROCHLORIDE AND NALOXONE HYDROCHLORIDE DIHYDRATE 1 TABLET: 8; 2 TABLET SUBLINGUAL at 17:42

## 2025-06-05 RX ADMIN — PHENOBARBITAL 16.2 MG: 32.4 TABLET ORAL at 08:24

## 2025-06-05 RX ADMIN — LEVOTHYROXINE SODIUM 25 MCG: 0.03 TABLET ORAL at 06:50

## 2025-06-05 RX ADMIN — PHENOBARBITAL 16.2 MG: 32.4 TABLET ORAL at 00:07

## 2025-06-05 RX ADMIN — ACETAMINOPHEN 650 MG: 325 TABLET ORAL at 06:50

## 2025-06-05 RX ADMIN — Medication 100 MG: at 08:25

## 2025-06-05 RX ADMIN — SODIUM CHLORIDE, PRESERVATIVE FREE 10 ML: 5 INJECTION INTRAVENOUS at 19:37

## 2025-06-05 ASSESSMENT — PAIN SCALES - GENERAL
PAINLEVEL_OUTOF10: 0
PAINLEVEL_OUTOF10: 2
PAINLEVEL_OUTOF10: 0
PAINLEVEL_OUTOF10: 2

## 2025-06-05 ASSESSMENT — PAIN SCALES - WONG BAKER
WONGBAKER_NUMERICALRESPONSE: NO HURT

## 2025-06-05 ASSESSMENT — PAIN DESCRIPTION - ORIENTATION: ORIENTATION: LEFT

## 2025-06-05 ASSESSMENT — PAIN DESCRIPTION - LOCATION: LOCATION: HEAD

## 2025-06-05 ASSESSMENT — PAIN DESCRIPTION - DESCRIPTORS: DESCRIPTORS: ACHING

## 2025-06-05 NOTE — PROGRESS NOTES
Psychiatric Progress Note    Napoleon Plasencia  [id number]  06/05/25    CHIEF COMPLAINT: :suicidal ideation     HPI: Patient is a 45 year old male with pmhx of hypothyroidism, alcohol use, opiate use on Suboxone, who presents to [facility] ED via police and placed on a psychiatric hold after ex GF showed police text messages that\"he was threatening to cut himself and threatening to commit suicide by \".  Telepsychiatry saw patient in the Ed and recommended inpatient psychiatric hospitalization. They noted he is having a difficult time with his partner and 3 daughters in his home. Patient currently denies SI to staff. Per re evaluation with Telepsychiatry patient did admit to making SI statements with plan of suicide by . Patient was admitted for alcohol w/d and currently receiving phenobarb taper. Psychiatry consulted by Dr Singh due to \"suicidal ideation.\"      Met with patient at bedside. He is alert and oriented x 4. He discusses stressors at home including disagreements with family members. He reports that he was able to stay sober in past and got off substances with the help of suboxone. Reports that he has been staying with mother that has introduced new stress. Discussed long term ongoing problems that patient has been having and that GF and mother appear to disagree with patient- that he is not working and alcohol consumption has drastically increased. Patient minimized situation including [facility] time. He does endorse depression and not taking his psychiatric medications. He would like to have those addressed in the psychiatric unit. Per discussion he is aware that we are not looking at psychiatric hospitals. Insight and judgment questionable.                 Allergies   Allergen Reactions    Nsaids Swelling       Medications Prior to Admission: buprenorphine-naloxone (SUBOXONE) 8-2 MG FILM SL film, Place 1 Film under the tongue daily.  buPROPion (WELLBUTRIN) 75 MG tablet, Take 1 tablet by mouth

## 2025-06-05 NOTE — PLAN OF CARE
Problem: Discharge Planning  Goal: Discharge to home or other facility with appropriate resources  6/5/2025 1143 by Reji Cullen RN  Outcome: Progressing  6/5/2025 0036 by Radha Wheeler RN  Outcome: Progressing     Problem: Safety - Adult  Goal: Free from fall injury  6/5/2025 1143 by Reji Cullen RN  Outcome: Progressing  6/5/2025 0036 by Radha Wheeler RN  Outcome: Progressing     Problem: Risk for Elopement  Goal: Patient will not exit the unit/facility without proper excort  6/5/2025 1143 by Reji Cullen RN  Outcome: Progressing  Flowsheets (Taken 6/5/2025 0800)  Nursing Interventions for Elopement Risk:   Assist with personal care needs such as toileting, eating, dressing, as needed to reduce the risk of wandering   Reduce environmental triggers   Make sure patient has all necessary personal care items   Collaborate with treatment team for drug withdrawal symptoms treatment  6/5/2025 0036 by Radha Wheeler RN  Outcome: Progressing  Flowsheets  Taken 6/4/2025 1931 by Radha Wheeler, RN  Nursing Interventions for Elopement Risk:   Assist with personal care needs such as toileting, eating, dressing, as needed to reduce the risk of wandering   Reduce environmental triggers   Make sure patient has all necessary personal care items   Collaborate with treatment team for drug withdrawal symptoms treatment  Taken 6/4/2025 1600 by Reji Cullen, RN  Nursing Interventions for Elopement Risk:   Assist with personal care needs such as toileting, eating, dressing, as needed to reduce the risk of wandering   Reduce environmental triggers   Make sure patient has all necessary personal care items   Collaborate with treatment team for drug withdrawal symptoms treatment  Taken 6/4/2025 1200 by Reji Cullen, RN  Nursing Interventions for Elopement Risk:   Assist with personal care needs such as toileting, eating, dressing, as needed to reduce the risk of wandering   Reduce environmental  triggers   Make sure patient has all necessary personal care items   Collaborate with treatment team for drug withdrawal symptoms treatment     Problem: Pain  Goal: Verbalizes/displays adequate comfort level or baseline comfort level  6/5/2025 1143 by Reji Cullen RN  Outcome: Progressing  6/5/2025 0036 by Radha Wheeler RN  Outcome: Progressing     Problem: Skin/Tissue Integrity  Goal: Skin integrity remains intact  Description: 1.  Monitor for areas of redness and/or skin breakdown2.  Assess vascular access sites hourly3.  Every 4-6 hours minimum:  Change oxygen saturation probe site4.  Every 4-6 hours:  If on nasal continuous positive airway pressure, respiratory therapy assess nares and determine need for appliance change or resting period  6/5/2025 1143 by Reji Cullen RN  Outcome: Progressing  6/5/2025 0036 by Radha Wheeler RN  Outcome: Progressing

## 2025-06-05 NOTE — PLAN OF CARE
Problem: Discharge Planning  Goal: Discharge to home or other facility with appropriate resources  Outcome: Progressing     Problem: Safety - Adult  Goal: Free from fall injury  Outcome: Progressing     Problem: Risk for Elopement  Goal: Patient will not exit the unit/facility without proper excort  Outcome: Progressing  Flowsheets  Taken 6/4/2025 1931 by Radha Wheeler, RN  Nursing Interventions for Elopement Risk:   Assist with personal care needs such as toileting, eating, dressing, as needed to reduce the risk of wandering   Reduce environmental triggers   Make sure patient has all necessary personal care items   Collaborate with treatment team for drug withdrawal symptoms treatment  Taken 6/4/2025 1600 by Reji Cullen RN  Nursing Interventions for Elopement Risk:   Assist with personal care needs such as toileting, eating, dressing, as needed to reduce the risk of wandering   Reduce environmental triggers   Make sure patient has all necessary personal care items   Collaborate with treatment team for drug withdrawal symptoms treatment  Taken 6/4/2025 1200 by Reji Cullen RN  Nursing Interventions for Elopement Risk:   Assist with personal care needs such as toileting, eating, dressing, as needed to reduce the risk of wandering   Reduce environmental triggers   Make sure patient has all necessary personal care items   Collaborate with treatment team for drug withdrawal symptoms treatment     Problem: Pain  Goal: Verbalizes/displays adequate comfort level or baseline comfort level  Outcome: Progressing     Problem: Skin/Tissue Integrity  Goal: Skin integrity remains intact  Description: 1.  Monitor for areas of redness and/or skin breakdown2.  Assess vascular access sites hourly3.  Every 4-6 hours minimum:  Change oxygen saturation probe site4.  Every 4-6 hours:  If on nasal continuous positive airway pressure, respiratory therapy assess nares and determine need for appliance change or resting

## 2025-06-05 NOTE — PROGRESS NOTES
Report called and given to Kira. Kell RN night nurse coming in was notified of patient being picked up at 1930 via [facility] going to [facility].

## 2025-06-05 NOTE — PROGRESS NOTES
Transfer Center Handoff for Behavioral Health Transfers      Patient's Current Location: [facility] ICU     Chief Complaint   Patient presents with    Mental Health Problem     Police report pt made suicidal comments via text to  ex girlfriend       Current or History of Violent Behavior: No    Currently in Restraints Now or During this Encounter: No  (Specify if Agitation or self harm is noted in ED?)  If yes, please describe behaviors requiring restraint:             Medical Clearance Documented and Verified in the Chart: Yes    Allergies   Allergen Reactions    Nsaids Swelling        Can Patient Tolerate Lying Flat: Yes    Able to Perform ADLs:  Yes  (Specify if able to ambulate or uses any mobility devices such as cane or walker)  Activity: In bed  Level of Assistance:    Assistive Device:    Miscellaneous Devices:      LABS    CBC:   Lab Results   Component Value Date/Time    WBC 4.9 06/03/2025 08:15 AM    RBC 4.31 06/03/2025 08:15 AM    HGB 15.3 06/03/2025 08:15 AM    HCT 44.6 06/03/2025 08:15 AM    .5 06/03/2025 08:15 AM    MCH 35.5 06/03/2025 08:15 AM    MCHC 34.3 06/03/2025 08:15 AM    RDW 11.4 06/03/2025 08:15 AM     06/03/2025 08:15 AM    MPV 10.5 06/03/2025 08:15 AM     CMP:   Lab Results   Component Value Date/Time     06/03/2025 08:15 AM    K 3.8 06/03/2025 08:15 AM    CL 96 06/03/2025 08:15 AM    CO2 27 06/03/2025 08:15 AM    BUN 12 06/03/2025 08:15 AM    CREATININE 0.7 06/03/2025 08:15 AM    LABGLOM >90 06/03/2025 08:15 AM    LABGLOM >60 01/05/2024 11:30 PM    GLUCOSE 93 06/03/2025 08:15 AM    CALCIUM 9.5 06/03/2025 08:15 AM    BILITOT 1.7 06/03/2025 08:15 AM    ALKPHOS 168 06/03/2025 08:15 AM     06/03/2025 08:15 AM     06/03/2025 08:15 AM     Drug Panel:   Lab Results   Component Value Date/Time    OPIAU NEGATIVE 06/01/2025 10:30 PM     UA:  Lab Results   Component Value Date/Time    COLORU YELLOW 01/06/2024 12:22 AM    PROTEINU NEGATIVE 01/06/2024 12:22 AM    GLUCOSEU

## 2025-06-05 NOTE — PROGRESS NOTES
Hospital Medicine Progress Note  V 5.17      Date of Admission: 6/1/2025    Hospital Day: 5      Chief Admission Complaint: Suicidal ideation    Subjective:      Patient no longer tremoring and has completed his phenobarb taper for withdrawal.  Has no acute complaints at this time.    Presenting Admission History:       45-year-old male presented to the emergency room with reports of suicidal ideation patient was evaluated by psychiatry and plan is to have the patient admitted to inpatient psych facility. Patient has acute alcohol intoxication at this time has been admitted failed [facility] protocol     Assessment/Plan:      Alcohol abuse with withdrawal  Patient states that he has had withdrawal but no seizure  Phenobarbital taper now complet  Thiamine  P.o. intake    Suicidal ideation  Currently pink slipped  Psychiatry consulted  Plan for inpatient at discharge    Substance abuse  Continue home Suboxone    Patient has now completed his phenobarbital taper for alcohol withdrawal.  No further signs of withdrawal.  He is medically cleared at this time for discharge to inpatient psychiatric care.    Ongoing threat to life and/or bodily function without ongoing treatment due to: Alcohol withdrawal     Consults:      IP CONSULT TO TELE-PSYCH (SOCIAL WORK ONLY)  IP CONSULT TO TELE-PSYCH (SOCIAL WORK ONLY)  IP CONSULT TO SOCIAL WORK  IP CONSULT TO SOCIAL WORK  IP CONSULT TO PSYCHIATRY        --------------------------------------------------      Medications:        Infusion Medications    sodium chloride      sodium chloride       Scheduled Medications    nicotine  1 patch TransDERmal Daily    buprenorphine-naloxone  1 tablet SubLINGual Daily    scopolamine  1 patch TransDERmal Once    sodium chloride flush  5-40 mL IntraVENous 2 times per day    thiamine  100 mg Oral Daily    sodium chloride flush  5-40 mL IntraVENous 2 times per day    enoxaparin  40 mg SubCUTAneous Daily    levothyroxine  25 mcg Oral Daily     PRN

## 2025-06-06 ENCOUNTER — HOSPITAL ENCOUNTER (OUTPATIENT)
Age: 45
Setting detail: SPECIMEN
Discharge: HOME | End: 2025-06-06

## 2025-06-07 NOTE — DISCHARGE SUMMARY
Hospital Medicine Discharge Summary    Patient: Napoleon Plasencia   : 1980     Hospital:  [facility]  Admit Date: 2025   Discharge Date: 2025    Disposition: Inpatient psychiatry  Code status:  Full  Condition at Discharge: Stable  Primary Care Provider: No primary care provider on file.    Admitting Provider: Veronika Singh MD  Discharge Provider: Gilles Shannon MD     Discharge Diagnoses:      Active Hospital Problems    Diagnosis     Acute alcohol abuse, uncomplicated [F10.920]        Presenting Admission History:      45-year-old male presented to the emergency room with reports of suicidal ideation patient was evaluated by psychiatry and plan is to have the patient admitted to inpatient psych facility. Patient has acute alcohol intoxication at this time has been admitted failed [facility] protocol      Assessment/Plan:      Alcohol abuse with withdrawal  Patient states that he has had withdrawal but no seizure  Phenobarbital taper now complet  Thiamine and folic acid  No further signs of withdrawal at the time of discharge     Suicidal ideation  Currently pink slipped  Psychiatry consulted  Plan for inpatient psychiatry care at discharge     Substance abuse  Continue home Suboxone    Physical Exam Performed:      BP (!) 142/91   Pulse 55   Temp 98 °F (36.7 °C) (Oral)   Resp 16   Ht 1.778 m (5' 10\")   Wt 64.5 kg (142 lb 3.2 oz)   SpO2 95%   BMI 20.40 kg/m²       General appearance:  No apparent distress, appears stated age and cooperative.  Respiratory:  Normal respiratory effort.   Cardiovascular:  Regular rate and rhythm.  Abdomen:  Soft, non-tender, non-distended.  Musculoskeletal:  No edema  Neurologic:  Non-focal  Psychiatric:  Alert and oriented    Patient Discharge Instructions:      Follow up:    1.  Primary Care Provider No primary care provider on file. in the next 1-2 weeks.      The patient was seen and examined on day of discharge and this discharge summary  is in conjunction with any daily progress note from day of discharge. Time spent on discharge: 35 minutes in the examination, evaluation, counseling and review of medications and discharge plan.    ------------------------------------------------------------------------------------------------------------------------------------------------------    Discharge Medications:   Discharge Medication List as of 6/5/2025  5:53 PM        START taking these medications    Details   thiamine 100 MG tablet Take 1 tablet by mouth daily, Disp-30 tablet, R-3Normal      buprenorphine-naloxone (SUBOXONE) 8-2 MG SUBL SL tablet Place 2 tablets under the tongue daily for 14 days. Max Daily Amount: 2 tablets, Disp-28 tablet, R-0Normal           Discharge Medication List as of 6/5/2025  5:53 PM        Discharge Medication List as of 6/5/2025  5:53 PM        CONTINUE these medications which have NOT CHANGED    Details   buprenorphine-naloxone (SUBOXONE) 8-2 MG FILM SL film Place 1 Film under the tongue daily.Historical Med      levothyroxine (SYNTHROID) 25 MCG tablet Take 1 tablet by mouth dailyHistorical Med           Discharge Medication List as of 6/5/2025  5:53 PM        STOP taking these medications       buPROPion (WELLBUTRIN) 75 MG tablet Comments:   Reason for Stopping:               Significant Test Results    No results found.    Consults:     IP CONSULT TO TELE-PSYCH (SOCIAL WORK ONLY)  IP CONSULT TO TELE-PSYCH (SOCIAL WORK ONLY)  IP CONSULT TO SOCIAL WORK  IP CONSULT TO SOCIAL WORK  IP CONSULT TO PSYCHIATRY    Labs:     No results for input(s): \"WBC\", \"HGB\", \"HCT\", \"PLT\" in the last 72 hours.  No results for input(s): \"NA\", \"K\", \"CL\", \"CO2\", \"BUN\", \"CREATININE\", \"CALCIUM\", \"MG\", \"PHOS\" in the last 72 hours.  No results for input(s): \"PROBNP\", \"TROPHS\" in the last 72 hours.  No results for input(s): \"LABA1C\" in the last 72 hours.  No results for input(s): \"AST\", \"ALT\", \"BILIDIR\", \"BILITOT\", \"ALKPHOS\" in the last 72 hours.  No